# Patient Record
Sex: FEMALE | Race: WHITE | NOT HISPANIC OR LATINO | Employment: UNEMPLOYED | URBAN - NONMETROPOLITAN AREA
[De-identification: names, ages, dates, MRNs, and addresses within clinical notes are randomized per-mention and may not be internally consistent; named-entity substitution may affect disease eponyms.]

---

## 2018-01-04 ENCOUNTER — OFFICE VISIT (OUTPATIENT)
Dept: CARDIOLOGY | Facility: CLINIC | Age: 58
End: 2018-01-04

## 2018-01-04 VITALS
HEART RATE: 67 BPM | SYSTOLIC BLOOD PRESSURE: 118 MMHG | OXYGEN SATURATION: 98 % | BODY MASS INDEX: 30.69 KG/M2 | WEIGHT: 184.2 LBS | DIASTOLIC BLOOD PRESSURE: 73 MMHG | HEIGHT: 65 IN

## 2018-01-04 DIAGNOSIS — R00.2 PALPITATIONS: ICD-10-CM

## 2018-01-04 DIAGNOSIS — E78.2 MIXED HYPERLIPIDEMIA: ICD-10-CM

## 2018-01-04 DIAGNOSIS — I10 ESSENTIAL HYPERTENSION: ICD-10-CM

## 2018-01-04 DIAGNOSIS — Z00.00 HEALTHCARE MAINTENANCE: ICD-10-CM

## 2018-01-04 DIAGNOSIS — Z82.49 FAMILY HISTORY OF CORONARY ARTERY DISEASE: ICD-10-CM

## 2018-01-04 DIAGNOSIS — R06.02 SHORTNESS OF BREATH: Primary | ICD-10-CM

## 2018-01-04 PROBLEM — E11.9 DIABETES: Status: ACTIVE | Noted: 2018-01-04

## 2018-01-04 PROCEDURE — 99204 OFFICE O/P NEW MOD 45 MIN: CPT | Performed by: INTERNAL MEDICINE

## 2018-01-04 PROCEDURE — 93000 ELECTROCARDIOGRAM COMPLETE: CPT | Performed by: INTERNAL MEDICINE

## 2018-01-04 RX ORDER — BENAZEPRIL HYDROCHLORIDE 5 MG/1
5 TABLET, FILM COATED ORAL DAILY
Refills: 0 | COMMUNITY
Start: 2017-12-10 | End: 2020-09-22 | Stop reason: SDUPTHER

## 2018-01-04 RX ORDER — MONTELUKAST SODIUM 10 MG/1
10 TABLET ORAL DAILY
Refills: 0 | COMMUNITY
Start: 2017-12-10

## 2018-01-04 RX ORDER — PRAVASTATIN SODIUM 20 MG
20 TABLET ORAL DAILY
Refills: 0 | COMMUNITY
Start: 2017-12-10 | End: 2018-07-24 | Stop reason: ALTCHOICE

## 2018-01-04 RX ORDER — LORATADINE 10 MG/1
10 TABLET ORAL DAILY
Refills: 0 | COMMUNITY
Start: 2017-12-10 | End: 2022-05-24

## 2018-01-04 RX ORDER — HYDROCHLOROTHIAZIDE 12.5 MG/1
12.5 TABLET ORAL DAILY
Refills: 0 | COMMUNITY
Start: 2017-11-11 | End: 2020-03-19 | Stop reason: SDUPTHER

## 2018-01-04 RX ORDER — PAROXETINE HYDROCHLORIDE 40 MG/1
40 TABLET, FILM COATED ORAL DAILY
Refills: 0 | COMMUNITY
Start: 2018-01-01

## 2018-01-04 RX ORDER — METFORMIN HYDROCHLORIDE 500 MG/1
500 TABLET, EXTENDED RELEASE ORAL 2 TIMES DAILY
Refills: 0 | COMMUNITY
Start: 2017-12-10 | End: 2019-01-24

## 2018-01-04 RX ORDER — ATENOLOL 50 MG/1
50 TABLET ORAL DAILY
Refills: 0 | COMMUNITY
Start: 2017-12-10 | End: 2018-07-24 | Stop reason: ALTCHOICE

## 2018-01-04 RX ORDER — GLIMEPIRIDE 4 MG/1
4 TABLET ORAL 2 TIMES DAILY
Refills: 0 | COMMUNITY
Start: 2017-12-10 | End: 2019-01-24

## 2018-01-04 RX ORDER — NICOTINE POLACRILEX 4 MG/1
40 GUM, CHEWING ORAL DAILY
Refills: 0 | COMMUNITY
Start: 2017-12-10
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
## 2018-01-17 ENCOUNTER — APPOINTMENT (OUTPATIENT)
Dept: CARDIOLOGY | Facility: HOSPITAL | Age: 58
End: 2018-01-17
Attending: INTERNAL MEDICINE

## 2018-01-30 ENCOUNTER — TELEPHONE (OUTPATIENT)
Dept: CARDIOLOGY | Facility: CLINIC | Age: 58
End: 2018-01-30

## 2018-01-30 NOTE — TELEPHONE ENCOUNTER
"PATIENT STATES HAS GOTTEN \"COLD SORES\" IN HER NOSE SINCE STARTING NEW PSORIASIS MED. STATED SHE HAD ALREADY CALLED PRESCRIBING DOCTOR. I TOLD HER THIS HAD NOTHING TO DO WITH HER STRESS TEST PAMELA., AND THAT SHE SHOULD BE FINE. MEDS FOR STRESS TEST PAMELA AND DIET DISCUSSED. PH,LPN  "

## 2018-01-30 NOTE — TELEPHONE ENCOUNTER
----- Message from Jud Beyer LPN sent at 1/30/2018 11:05 AM EST -----   Patient states homer is scheduled for a Stress Test in the AM and she was recently started on a new Psoriasis medication and she has questions about it.

## 2018-01-31 ENCOUNTER — HOSPITAL ENCOUNTER (OUTPATIENT)
Dept: CARDIOLOGY | Facility: HOSPITAL | Age: 58
Discharge: HOME | End: 2018-01-31
Attending: INTERNAL MEDICINE

## 2018-01-31 ENCOUNTER — OUTSIDE FACILITY SERVICE (OUTPATIENT)
Dept: CARDIOLOGY | Facility: CLINIC | Age: 58
End: 2018-01-31

## 2018-01-31 LAB
MAXIMAL PREDICTED HEART RATE: 164 BPM
MAXIMAL PREDICTED HEART RATE: 164 BPM
STRESS TARGET HR: 139 BPM
STRESS TARGET HR: 139 BPM

## 2018-01-31 PROCEDURE — 93018 CV STRESS TEST I&R ONLY: CPT | Performed by: INTERNAL MEDICINE

## 2018-01-31 PROCEDURE — A9500 TC99M SESTAMIBI: HCPCS | Performed by: INTERNAL MEDICINE

## 2018-01-31 PROCEDURE — 78452 HT MUSCLE IMAGE SPECT MULT: CPT | Performed by: INTERNAL MEDICINE

## 2018-01-31 PROCEDURE — 93306 TTE W/DOPPLER COMPLETE: CPT

## 2018-01-31 PROCEDURE — 93017 CV STRESS TEST TRACING ONLY: CPT

## 2018-01-31 PROCEDURE — 78452 HT MUSCLE IMAGE SPECT MULT: CPT

## 2018-01-31 PROCEDURE — 93306 TTE W/DOPPLER COMPLETE: CPT | Performed by: INTERNAL MEDICINE

## 2018-01-31 PROCEDURE — 0 TECHNETIUM SESTAMIBI: Performed by: INTERNAL MEDICINE

## 2018-01-31 RX ADMIN — TECHNETIUM TC 99M SESTAMIBI 1 DOSE: 1 INJECTION INTRAVENOUS at 09:30

## 2018-02-01 ENCOUNTER — TELEPHONE (OUTPATIENT)
Dept: CARDIOLOGY | Facility: CLINIC | Age: 58
End: 2018-02-01

## 2018-02-01 NOTE — TELEPHONE ENCOUNTER
Patient aware stress and echo results. Patient verbalize understanding and was informed to proceed to ER with any worsening symptoms.  Radha scheduled follow up apt. JANELLE/TERRELL

## 2018-02-07 ENCOUNTER — APPOINTMENT (OUTPATIENT)
Dept: LAB | Facility: HOSPITAL | Age: 58
End: 2018-02-07

## 2018-02-07 ENCOUNTER — OFFICE VISIT (OUTPATIENT)
Dept: CARDIOLOGY | Facility: CLINIC | Age: 58
End: 2018-02-07

## 2018-02-07 VITALS
HEART RATE: 98 BPM | SYSTOLIC BLOOD PRESSURE: 144 MMHG | HEIGHT: 65 IN | BODY MASS INDEX: 30.92 KG/M2 | OXYGEN SATURATION: 98 % | DIASTOLIC BLOOD PRESSURE: 88 MMHG | WEIGHT: 185.6 LBS

## 2018-02-07 DIAGNOSIS — R07.2 PRECORDIAL PAIN: Primary | ICD-10-CM

## 2018-02-07 DIAGNOSIS — R94.39 ABNORMAL STRESS TEST: ICD-10-CM

## 2018-02-07 DIAGNOSIS — R06.02 SHORTNESS OF BREATH: ICD-10-CM

## 2018-02-07 PROCEDURE — 80048 BASIC METABOLIC PNL TOTAL CA: CPT | Performed by: PHYSICIAN ASSISTANT

## 2018-02-07 PROCEDURE — 36415 COLL VENOUS BLD VENIPUNCTURE: CPT | Performed by: PHYSICIAN ASSISTANT

## 2018-02-07 PROCEDURE — 99213 OFFICE O/P EST LOW 20 MIN: CPT | Performed by: PHYSICIAN ASSISTANT

## 2018-02-07 RX ORDER — GABAPENTIN 300 MG/1
300 CAPSULE ORAL
Refills: 0 | COMMUNITY
Start: 2018-01-08

## 2018-02-07 RX ORDER — CYCLOBENZAPRINE HCL 10 MG
10 TABLET ORAL 2 TIMES DAILY
Refills: 0 | COMMUNITY
Start: 2018-01-10

## 2018-02-07 RX ORDER — MELOXICAM 15 MG/1
15 TABLET ORAL DAILY
Refills: 0 | COMMUNITY
Start: 2018-01-10 | End: 2018-06-20

## 2018-02-07 RX ORDER — CLOPIDOGREL BISULFATE 75 MG/1
75 TABLET ORAL DAILY
Qty: 30 TABLET | Refills: 5 | Status: SHIPPED | OUTPATIENT
Start: 2018-02-07 | End: 2018-07-10 | Stop reason: SDUPTHER

## 2018-02-07 RX ORDER — HYDROCODONE BITARTRATE AND ACETAMINOPHEN 5; 325 MG/1; MG/1
1 TABLET ORAL 2 TIMES DAILY
Refills: 0 | COMMUNITY
Start: 2018-01-10

## 2018-02-07 NOTE — PATIENT INSTRUCTIONS
Coronary Angiogram With Stent  Coronary angiogram with stent placement is a procedure to widen or open a narrow blood vessel of the heart (coronary artery). Arteries may become blocked by cholesterol buildup (plaques) in the lining or wall. When a coronary artery becomes partially blocked, blood flow to that area decreases. This may lead to chest pain or a heart attack (myocardial infarction).  A stent is a small piece of metal that looks like mesh or a spring. Stent placement may be done as treatment for a heart attack or right after a coronary angiogram in which a blocked artery is found.  Let your health care provider know about:  · Any allergies you have.  · All medicines you are taking, including vitamins, herbs, eye drops, creams, and over-the-counter medicines.  · Any problems you or family members have had with anesthetic medicines.  · Any blood disorders you have.  · Any surgeries you have had.  · Any medical conditions you have.  · Whether you are pregnant or may be pregnant.  What are the risks?  Generally, this is a safe procedure. However, problems may occur, including:  · Damage to the heart or its blood vessels.  · A return of blockage.  · Bleeding, infection, or bruising at the insertion site.  · A collection of blood under the skin (hematoma) at the insertion site.  · A blood clot in another part of the body.  · Kidney injury.  · Allergic reaction to the dye or contrast that is used.  · Bleeding into the abdomen (retroperitoneal bleeding).  What happens before the procedure?  Staying hydrated   Follow instructions from your health care provider about hydration, which may include:  · Up to 2 hours before the procedure - you may continue to drink clear liquids, such as water, clear fruit juice, black coffee, and plain tea.  Eating and drinking restrictions   Follow instructions from your health care provider about eating and drinking, which may include:  · 8 hours before the procedure - stop eating  heavy meals or foods such as meat, fried foods, or fatty foods.  · 6 hours before the procedure - stop eating light meals or foods, such as toast or cereal.  · 2 hours before the procedure - stop drinking clear liquids.  Ask your health care provider about:  · Changing or stopping your regular medicines. This is especially important if you are taking diabetes medicines or blood thinners.  · Taking medicines such as ibuprofen. These medicines can thin your blood. Do not take these medicines before your procedure if your health care provider instructs you not to. Generally, aspirin is recommended before a procedure of passing a small, thin tube (catheter) through a blood vessel and into the heart (cardiac catheterization).  What happens during the procedure?  · An IV tube will be inserted into one of your veins.  · You will be given one or more of the following:  ¨ A medicine to help you relax (sedative).  ¨ A medicine to numb the area where the catheter will be inserted into an artery (local anesthetic).  · To reduce your risk of infection:  ¨ Your health care team will wash or sanitize their hands.  ¨ Your skin will be washed with soap.  ¨ Hair may be removed from the area where the catheter will be inserted.  · Using a guide wire, the catheter will be inserted into an artery. The location may be in your groin, in your wrist, or in the fold of your arm (near your elbow).  · A type of X-ray (fluoroscopy) will be used to help guide the catheter to the opening of the arteries in the heart.  · A dye will be injected into the catheter, and X-rays will be taken. The dye will help to show where any narrowing or blockages are located in the arteries.  · A tiny wire will be guided to the blocked spot, and a balloon will be inflated to make the artery wider.  · The stent will be expanded and will crush the plaques into the wall of the vessel. The stent will hold the area open and improve the blood flow. Most stents have a  drug coating to reduce the risk of the stent narrowing over time.  · The artery may be made wider using a drill, laser, or other tools to remove plaques.  · When the blood flow is better, the catheter will be removed. The lining of the artery will grow over the stent, which stays where it was placed.  This procedure may vary among health care providers and hospitals.  What happens after the procedure?  · If the procedure is done through the leg, you will be kept in bed lying flat for about 6 hours. You will be instructed to not bend and not cross your legs.  · The insertion site will be checked frequently.  · The pulse in your foot or wrist will be checked frequently.  · You may have additional blood tests, X-rays, and a test that records the electrical activity of your heart (electrocardiogram, or ECG).  This information is not intended to replace advice given to you by your health care provider. Make sure you discuss any questions you have with your health care provider.  Document Released: 06/23/2004 Document Revised: 08/17/2017 Document Reviewed: 07/23/2017  Elsevier Interactive Patient Education © 2017 [employer]

## 2018-02-07 NOTE — PROGRESS NOTES
Problem list     Subjective   Bonnie Mohr is a 57 y.o. female     Chief Complaint   Patient presents with   • Follow-up     patient appears in office today for follow up test results (stress/echo)   • Shortness of Breath   • Hypertension       HPI  The patient presents back to review test results.  She was initially seen because of potential cardiomegaly on radiologic studies.  Also with family history, hypertension, dyslipidemia, and symptoms, cardiac evaluation was requested as well.  The patient did have a stress test which suggested possible mild posterior lateral wall ischemia.  Also, the patient's TID was elevated suggesting increased risk.  The patient's echo indicated preserved systolic function, LVH with Mcclelland grade 1 diastolic dysfunction, no significant valvular issues.  The patient continues to have shortness of air with activity.  She has chest tightness at that time as well.  She relates to no PND or orthopnea.  Her chest pain is transient in mild to moderate at best.  This last varied amounts at times.  She has no further complaints otherwise.    Current Outpatient Prescriptions   Medication Sig Dispense Refill   • atenolol (TENORMIN) 50 MG tablet Take 50 mg by mouth Daily.  0   • benazepril (LOTENSIN) 10 MG tablet Take 10 mg by mouth Daily.  0   • cyclobenzaprine (FLEXERIL) 10 MG tablet Take 10 mg by mouth 2 (Two) Times a Day.  0   • gabapentin (NEURONTIN) 300 MG capsule Take 300 mg by mouth every night at bedtime.  0   • glimepiride (AMARYL) 4 MG tablet Take 4 mg by mouth 2 (Two) Times a Day.  0   • hydrochlorothiazide (HYDRODIURIL) 12.5 MG tablet Take 12.5 mg by mouth Daily.  0   • HYDROcodone-acetaminophen (NORCO) 5-325 MG per tablet Take 1 tablet by mouth 2 (Two) Times a Day.  0   • loratadine (CLARITIN) 10 MG tablet Take 10 mg by mouth Daily.  0   • meloxicam (MOBIC) 15 MG tablet Take 15 mg by mouth Daily.  0   • metFORMIN ER (GLUCOPHAGE-XR) 500 MG 24 hr tablet Take 500 mg by mouth 3 (Three)  Times a Day.  0   • montelukast (SINGULAIR) 10 MG tablet Take 10 mg by mouth Daily.  0   • Multiple Vitamin (MULTI VITAMIN PO) Take  by mouth Daily.     • omeprazole (priLOSEC) 20 MG capsule Take 20 mg by mouth Daily.  0   • PARoxetine (PAXIL) 40 MG tablet Take 40 mg by mouth Daily.  0   • pravastatin (PRAVACHOL) 20 MG tablet Take 20 mg by mouth Daily.  0   • clopidogrel (PLAVIX) 75 MG tablet Take 1 tablet by mouth Daily. 30 tablet 5     No current facility-administered medications for this visit.        Erythromycin    Past Medical History:   Diagnosis Date   • Arthritis    • Diabetes mellitus    • Hyperlipidemia    • Hypertension    • Psoriasis    • Psoriatic arthritis        Social History     Social History   • Marital status:      Spouse name: N/A   • Number of children: N/A   • Years of education: N/A     Occupational History   • Not on file.     Social History Main Topics   • Smoking status: Never Smoker   • Smokeless tobacco: Never Used   • Alcohol use No   • Drug use: No   • Sexual activity: Defer     Other Topics Concern   • Not on file     Social History Narrative       Family History   Problem Relation Age of Onset   • Heart disease Mother    • Heart disease Father    • Heart disease Sister    • Diabetes Sister    • Heart disease Brother        Review of Systems   Constitutional: Negative.  Negative for fatigue.   HENT: Positive for congestion (head congestion due to allergies). Negative for rhinorrhea, sneezing and sore throat.    Eyes: Positive for visual disturbance (wears glasses daily).   Respiratory: Positive for cough (dry allergy cough) and shortness of breath (easily SOA ; worse on exertion ). Negative for apnea, chest tightness and wheezing.    Cardiovascular: Positive for palpitations (occasional palpitations/flutters). Negative for chest pain (denies CP) and leg swelling.   Gastrointestinal: Negative.  Negative for abdominal distention, abdominal pain, nausea and vomiting.   Endocrine:  "Negative.  Negative for cold intolerance, heat intolerance, polyphagia and polyuria.   Genitourinary: Negative.  Negative for difficulty urinating, frequency and urgency.   Musculoskeletal: Positive for arthralgias (psoriatic arthritis ). Negative for back pain, myalgias, neck pain and neck stiffness.   Skin: Positive for rash (psoriasis). Negative for wound.   Allergic/Immunologic: Positive for environmental allergies (seasonal allergies). Negative for food allergies.   Neurological: Negative.  Negative for dizziness, weakness, light-headedness and headaches.   Hematological: Bruises/bleeds easily (bruises and bleeds easily).   Psychiatric/Behavioral: Negative.  Negative for agitation, confusion and sleep disturbance (denies waking up smothering/SOA). The patient is not nervous/anxious.        Objective   Vitals:    02/07/18 1036   BP: 144/88   BP Location: Left arm   Patient Position: Sitting   Pulse: 98   SpO2: 98%   Weight: 84.2 kg (185 lb 9.6 oz)   Height: 165.1 cm (65\")      /88 (BP Location: Left arm, Patient Position: Sitting)  Pulse 98  Ht 165.1 cm (65\")  Wt 84.2 kg (185 lb 9.6 oz)  SpO2 98%  BMI 30.89 kg/m2   Lab Results (most recent)     None        Physical Exam   Constitutional: She is oriented to person, place, and time. She appears well-developed and well-nourished. No distress.   HENT:   Head: Normocephalic and atraumatic.   Mouth/Throat: Uvula is midline. No oral lesions.   Eyes: Conjunctivae, EOM and lids are normal. Pupils are equal, round, and reactive to light. Lids are everted and swept, no foreign bodies found.   Neck: Normal range of motion. Neck supple. Normal carotid pulses, no hepatojugular reflux and no JVD present. Carotid bruit is not present. No tracheal deviation present. No thyroid mass and no thyromegaly present.   Cardiovascular: Normal rate, regular rhythm, intact distal pulses and normal pulses.  Exam reveals gallop and S4 (soft). Exam reveals no S3, no friction rub " and no decreased pulses.    No murmur heard.  Pulses:       Carotid pulses are 2+ on the right side, and 2+ on the left side.       Radial pulses are 2+ on the right side, and 2+ on the left side.        Dorsalis pedis pulses are 2+ on the right side, and 2+ on the left side.        Posterior tibial pulses are 2+ on the right side, and 2+ on the left side.   Pulmonary/Chest: Effort normal and breath sounds normal. She has no decreased breath sounds (normal EXP. phase, normal breath sound intensity ). She has no wheezes. She has no rhonchi. She has no rales.   Abdominal: Soft. Bowel sounds are normal. She exhibits no distension, no abdominal bruit and no mass. There is no tenderness. There is no rebound and no guarding.   Negative organomegaly      Musculoskeletal: Normal range of motion. She exhibits no edema, tenderness or deformity.   Lymphadenopathy:     She has no cervical adenopathy.     She has no axillary adenopathy.   Neurological: She is alert and oriented to person, place, and time.   Skin: Skin is warm and dry. No rash noted. No erythema. No pallor.   Psychiatric: She has a normal mood and affect. Her speech is normal and behavior is normal. Judgment and thought content normal. Cognition and memory are normal.   Nursing note and vitals reviewed.        Procedure   Procedures       Assessment/Plan      Diagnosis Plan   1. Precordial pain  Cardiac catheterization    Basic Metabolic Panel   2. Shortness of breath  Cardiac catheterization    Basic Metabolic Panel   3. Abnormal stress test  Cardiac catheterization    Basic Metabolic Panel       With an abnormal stress test and ongoing symptoms, the patient will be scheduled for catheterization.  I have asked that she start aspirin.  I will send him Plavix therapy as well.  I will make no changes and statin therapy for now.  I would like to reevaluate labs before catheterization so that we may review renal function and hematologic parameters.  Nothing further  for now.  We will see her after catheter and recommend further at that time.               Electronically signed by:

## 2018-02-08 LAB
ANION GAP SERPL CALCULATED.3IONS-SCNC: 13.3 MMOL/L (ref 3.6–11.2)
BUN BLD-MCNC: 12 MG/DL (ref 7–21)
BUN/CREAT SERPL: 15.6 (ref 7–25)
CALCIUM SPEC-SCNC: 9.4 MG/DL (ref 7.7–10)
CHLORIDE SERPL-SCNC: 96 MMOL/L (ref 99–112)
CO2 SERPL-SCNC: 26.7 MMOL/L (ref 24.3–31.9)
CREAT BLD-MCNC: 0.77 MG/DL (ref 0.43–1.29)
GFR SERPL CREATININE-BSD FRML MDRD: 78 ML/MIN/1.73
GLUCOSE BLD-MCNC: 330 MG/DL (ref 70–110)
OSMOLALITY SERPL CALC.SUM OF ELEC: 284.6 MOSM/KG (ref 273–305)
POTASSIUM BLD-SCNC: 4.4 MMOL/L (ref 3.5–5.3)
SODIUM BLD-SCNC: 136 MMOL/L (ref 135–153)

## 2018-02-23 ENCOUNTER — OUTSIDE FACILITY SERVICE (OUTPATIENT)
Dept: CARDIOLOGY | Facility: CLINIC | Age: 58
End: 2018-02-23

## 2018-02-23 PROCEDURE — 93458 L HRT ARTERY/VENTRICLE ANGIO: CPT | Performed by: INTERNAL MEDICINE

## 2018-02-23 PROCEDURE — 92928 PRQ TCAT PLMT NTRAC ST 1 LES: CPT | Performed by: INTERNAL MEDICINE

## 2018-02-23 PROCEDURE — 92978 ENDOLUMINL IVUS OCT C 1ST: CPT | Performed by: INTERNAL MEDICINE

## 2018-03-01 ENCOUNTER — OFFICE VISIT (OUTPATIENT)
Dept: CARDIOLOGY | Facility: CLINIC | Age: 58
End: 2018-03-01

## 2018-03-01 VITALS
BODY MASS INDEX: 31.02 KG/M2 | HEIGHT: 65 IN | DIASTOLIC BLOOD PRESSURE: 77 MMHG | OXYGEN SATURATION: 97 % | HEART RATE: 74 BPM | SYSTOLIC BLOOD PRESSURE: 123 MMHG | WEIGHT: 186.2 LBS

## 2018-03-01 DIAGNOSIS — I25.10 CORONARY ARTERY DISEASE INVOLVING NATIVE CORONARY ARTERY OF NATIVE HEART WITHOUT ANGINA PECTORIS: ICD-10-CM

## 2018-03-01 DIAGNOSIS — R06.02 SHORTNESS OF BREATH: ICD-10-CM

## 2018-03-01 DIAGNOSIS — R07.9 CHEST PAIN, UNSPECIFIED TYPE: Primary | ICD-10-CM

## 2018-03-01 PROCEDURE — 99214 OFFICE O/P EST MOD 30 MIN: CPT | Performed by: PHYSICIAN ASSISTANT

## 2018-03-01 RX ORDER — IBUPROFEN 800 MG/1
800 TABLET ORAL AS NEEDED
Refills: 0 | COMMUNITY
Start: 2018-02-08 | End: 2018-07-24 | Stop reason: ALTCHOICE

## 2018-03-01 RX ORDER — MIRTAZAPINE 15 MG/1
15 TABLET, FILM COATED ORAL NIGHTLY
Refills: 0 | COMMUNITY
Start: 2018-02-20 | End: 2018-06-20

## 2018-03-01 RX ORDER — ISOSORBIDE MONONITRATE 30 MG/1
30 TABLET, EXTENDED RELEASE ORAL EVERY MORNING
Qty: 30 TABLET | Refills: 11 | Status: SHIPPED | OUTPATIENT
Start: 2018-03-01 | End: 2019-01-24 | Stop reason: SDUPTHER

## 2018-03-01 NOTE — PROGRESS NOTES
Problem list     Subjective   Bonnie Mohr is a 57 y.o. female     Chief Complaint   Patient presents with   • Coronary Artery Disease     Here for heart cath. f/u   • Hypertension   • Palpitations   • Hyperlipidemia   • Diabetes       HPI  Problem list  1.  Coronary artery disease  1.1 cardiac catheterization February 2018 because of posterolateral ischemia and elevated TID with high-grade stenosis of the RCA status post stenting.  70-80% ostial stenosis of the LAD with medical management recommended and percutaneous intervention for refractory symptoms  2.  Preserved systolic function  3.  Psoriatic arthritis  4.  Diabetes mellitus  5.  Hypertension  6.  Dyslipidemia       Patient is a 57-year-old female that presents back to office to follow-up.  Unfortunately, she continues to have chest discomfort.  She describes having chest discomfort at random times but will experience it frequently at night.  She describes as a burning sensation as well as a pressure that will occur and resolve.  She describes having moderate levels of dyspnea unchanged from before catheterization until now.  No PND orthopnea.  She does palpitate on occasion but no dizziness presyncope or syncope and otherwise is doing well          Outpatient Encounter Prescriptions as of 3/1/2018   Medication Sig Dispense Refill   • aspirin 81 MG tablet Take 81 mg by mouth Daily.     • atenolol (TENORMIN) 50 MG tablet Take 50 mg by mouth Daily.  0   • benazepril (LOTENSIN) 10 MG tablet Take 10 mg by mouth Daily.  0   • clopidogrel (PLAVIX) 75 MG tablet Take 1 tablet by mouth Daily. 30 tablet 5   • cyclobenzaprine (FLEXERIL) 10 MG tablet Take 10 mg by mouth 2 (Two) Times a Day.  0   • gabapentin (NEURONTIN) 300 MG capsule Take 300 mg by mouth every night at bedtime.  0   • glimepiride (AMARYL) 4 MG tablet Take 4 mg by mouth 2 (Two) Times a Day.  0   • hydrochlorothiazide (HYDRODIURIL) 12.5 MG tablet Take 12.5 mg by mouth Daily.  0   •  HYDROcodone-acetaminophen (NORCO) 5-325 MG per tablet Take 1 tablet by mouth 2 (Two) Times a Day.  0   • ibuprofen (ADVIL,MOTRIN) 800 MG tablet prn  0   • loratadine (CLARITIN) 10 MG tablet Take 10 mg by mouth Daily.  0   • meloxicam (MOBIC) 15 MG tablet Take 15 mg by mouth Daily.  0   • metFORMIN ER (GLUCOPHAGE-XR) 500 MG 24 hr tablet Take 500 mg by mouth 3 (Three) Times a Day.  0   • mirtazapine (REMERON) 15 MG tablet Every Night.  0   • montelukast (SINGULAIR) 10 MG tablet Take 10 mg by mouth Daily.  0   • Multiple Vitamin (MULTI VITAMIN PO) Take  by mouth Daily.     • omeprazole (priLOSEC) 20 MG capsule Take 20 mg by mouth Daily.  0   • PARoxetine (PAXIL) 40 MG tablet Take 40 mg by mouth Daily.  0   • pravastatin (PRAVACHOL) 20 MG tablet Take 20 mg by mouth Daily.  0   • isosorbide mononitrate (IMDUR) 30 MG 24 hr tablet Take 1 tablet by mouth Every Morning. 30 tablet 11     No facility-administered encounter medications on file as of 3/1/2018.        Erythromycin    Past Medical History:   Diagnosis Date   • Arthritis    • Coronary artery disease    • Diabetes mellitus    • Hyperlipidemia    • Hypertension    • Psoriasis    • Psoriatic arthritis        Social History     Social History   • Marital status:      Spouse name: N/A   • Number of children: N/A   • Years of education: N/A     Occupational History   • Not on file.     Social History Main Topics   • Smoking status: Never Smoker   • Smokeless tobacco: Never Used   • Alcohol use No   • Drug use: No   • Sexual activity: Defer     Other Topics Concern   • Not on file     Social History Narrative       Family History   Problem Relation Age of Onset   • Heart disease Mother    • Heart disease Father    • Heart disease Sister    • Diabetes Sister    • Heart disease Brother        Review of Systems   Constitutional: Positive for fatigue.   HENT: Negative.    Eyes: Positive for visual disturbance (glasses).   Respiratory: Positive for shortness of breath  "(mildly with exertion).    Cardiovascular: Positive for chest pain, palpitations and leg swelling (occas. mildly).   Gastrointestinal: Negative.    Endocrine: Negative.    Genitourinary: Negative.    Musculoskeletal: Positive for arthralgias (psoriatic) and myalgias.   Skin: Negative.    Allergic/Immunologic: Positive for environmental allergies.   Neurological: Positive for dizziness and light-headedness.   Hematological: Bruises/bleeds easily (bruise).   Psychiatric/Behavioral: Positive for sleep disturbance (off and on).       Objective   Vitals:    03/01/18 1021   BP: 123/77   BP Location: Left arm   Patient Position: Sitting   Pulse: 74   SpO2: 97%   Weight: 84.5 kg (186 lb 3.2 oz)   Height: 165.1 cm (65\")      /77 (BP Location: Left arm, Patient Position: Sitting)  Pulse 74  Ht 165.1 cm (65\")  Wt 84.5 kg (186 lb 3.2 oz)  SpO2 97%  BMI 30.99 kg/m2    Lab Results (most recent)     None          Physical Exam   Constitutional: She is oriented to person, place, and time. She appears well-developed and well-nourished. No distress.   HENT:   Head: Normocephalic and atraumatic.   Eyes: EOM are normal. Pupils are equal, round, and reactive to light.   Neck: No JVD present.   Cardiovascular: Normal rate, regular rhythm and normal heart sounds.  Exam reveals no gallop and no friction rub.    No murmur heard.  Pulmonary/Chest: Effort normal and breath sounds normal. No respiratory distress. She has no wheezes. She has no rales. She exhibits no tenderness.   Musculoskeletal: Normal range of motion. She exhibits no edema.   Neurological: She is alert and oriented to person, place, and time. No cranial nerve deficit.   Skin: Skin is warm and dry. No rash noted. No erythema. No pallor.   Psychiatric: She has a normal mood and affect. Her behavior is normal.   Nursing note and vitals reviewed.      Procedure   Procedures       Assessment/Plan     Problems Addressed this Visit        Cardiovascular and Mediastinum "    Coronary artery disease involving native coronary artery of native heart without angina pectoris    Relevant Medications    isosorbide mononitrate (IMDUR) 30 MG 24 hr tablet       Respiratory    Shortness of breath       Nervous and Auditory    Chest pain - Primary    Relevant Orders    Ambulatory Referral to Gastroenterology            Recommendation    1.  Dr. Toribio came in the room to further discuss with the patient.  It was discussed about intervening in the LAD as well as looking for alternative causes of chest pain.  Patient would like to try that first.  She would like to have medical management.  If she continues to have symptoms on nitrates, she will consider cardiac catheterization.  We will prescribe isosorbide.  I will refer to GI because the patient's chest pain expressed at night frequently.    2.  We will see patient back for follow-up in a few weeks.  Any chest pain not resolved by nitroglycerin, she is to go to the ER.  Follow-up primary as scheduled         Electronically signed by:

## 2018-03-17 ENCOUNTER — APPOINTMENT (OUTPATIENT)
Dept: WOMENS IMAGING | Facility: HOSPITAL | Age: 58
End: 2018-03-17

## 2018-03-17 PROCEDURE — 77063 BREAST TOMOSYNTHESIS BI: CPT | Performed by: RADIOLOGY

## 2018-03-17 PROCEDURE — 77067 SCR MAMMO BI INCL CAD: CPT | Performed by: RADIOLOGY

## 2018-05-10 ENCOUNTER — OFFICE VISIT (OUTPATIENT)
Dept: CARDIOLOGY | Facility: CLINIC | Age: 58
End: 2018-05-10

## 2018-05-10 VITALS
WEIGHT: 185.2 LBS | DIASTOLIC BLOOD PRESSURE: 74 MMHG | HEIGHT: 65 IN | BODY MASS INDEX: 30.86 KG/M2 | HEART RATE: 84 BPM | SYSTOLIC BLOOD PRESSURE: 117 MMHG | OXYGEN SATURATION: 99 %

## 2018-05-10 DIAGNOSIS — R00.2 PALPITATIONS: ICD-10-CM

## 2018-05-10 DIAGNOSIS — I25.10 CORONARY ARTERY DISEASE INVOLVING NATIVE CORONARY ARTERY OF NATIVE HEART WITHOUT ANGINA PECTORIS: ICD-10-CM

## 2018-05-10 DIAGNOSIS — R06.02 SHORTNESS OF BREATH: ICD-10-CM

## 2018-05-10 DIAGNOSIS — R07.2 PRECORDIAL PAIN: Primary | ICD-10-CM

## 2018-05-10 DIAGNOSIS — E78.2 MIXED HYPERLIPIDEMIA: ICD-10-CM

## 2018-05-10 DIAGNOSIS — R55 SYNCOPE, UNSPECIFIED SYNCOPE TYPE: ICD-10-CM

## 2018-05-10 DIAGNOSIS — I10 ESSENTIAL HYPERTENSION: ICD-10-CM

## 2018-05-10 DIAGNOSIS — Z82.49 FAMILY HISTORY OF CORONARY ARTERY DISEASE: ICD-10-CM

## 2018-05-10 PROCEDURE — 99214 OFFICE O/P EST MOD 30 MIN: CPT | Performed by: INTERNAL MEDICINE

## 2018-05-10 RX ORDER — NITROGLYCERIN 0.4 MG/1
TABLET SUBLINGUAL
Qty: 25 TABLET | Refills: 11 | Status: SHIPPED | OUTPATIENT
Start: 2018-05-10 | End: 2020-09-22 | Stop reason: SDUPTHER

## 2018-05-10 RX ORDER — ADALIMUMAB 40MG/0.8ML
KIT SUBCUTANEOUS
COMMUNITY
Start: 2018-05-02 | End: 2019-07-26

## 2018-05-10 NOTE — PROGRESS NOTES
"Subjective   Bonnie Mohr is a 57 y.o. female     Chief Complaint   Patient presents with   • Follow-up     2 month f/u    • Loss of Consciousness     had a pain through forhead radited down right eye while singing in [place] and sudden syncope, did not lose control of bowel or bladder   • Shortness of Breath     with exertion    • Chest Pain     rare, pressure like        PROBLEM LIST:   1.  Coronary artery disease  1.1 cardiac catheterization February 2018 because of posterolateral ischemia and elevated TID with high-grade stenosis of the RCA status post stenting.  70-80% ostial stenosis of the LAD with medical management recommended and percutaneous intervention for refractory symptoms  2.  Preserved systolic function  3.  Psoriatic arthritis  4.  Diabetes mellitus  5.  Hypertension  6.  Dyslipidemia      Specialty Problems        Cardiology Problems    Essential hypertension        Mixed hyperlipidemia        Palpitations        Coronary artery disease involving native coronary artery of native heart without angina pectoris                HPI:  Ms. Mohr returns for follow-up on coronary artery disease and cardiac risk factor modification.    She had minimal improvement with the addition of long-acting nitroglycerin.  She continues to have mild chest discomfort when she climbs stairs or walks and inclined, but has only shortness of breath walking on a level surface.  However, she feels that her shortness of breath is still disproportionate to her degree of physical activity.    Ms. Mohr denies orthopnea, PND, or lower extremity edema.  She doesn't palpitate.  The patient describes one episode of syncope.  She was singing in [place], had been standing for proximal he 10 minutes, felt warm.  She then began to feel \"funny\" but was not palpitating and did not have chest pain or shortness of breath.  She had a brief loss of consciousness without loss of bowel or bladder control and awakened without Armond's " paralysis, post ictal state, chest pain, or dyspnea.  CURRENT MEDICATION:    Current Outpatient Prescriptions   Medication Sig Dispense Refill   • aspirin 81 MG tablet Take 81 mg by mouth Daily.     • atenolol (TENORMIN) 50 MG tablet Take 50 mg by mouth Daily.  0   • benazepril (LOTENSIN) 10 MG tablet Take 10 mg by mouth Daily.  0   • clopidogrel (PLAVIX) 75 MG tablet Take 1 tablet by mouth Daily. 30 tablet 5   • cyclobenzaprine (FLEXERIL) 10 MG tablet Take 10 mg by mouth 2 (Two) Times a Day.  0   • gabapentin (NEURONTIN) 300 MG capsule Take 300 mg by mouth every night at bedtime.  0   • glimepiride (AMARYL) 4 MG tablet Take 4 mg by mouth 2 (Two) Times a Day.  0   • HUMIRA PEN 40 MG/0.8ML Pen-injector Kit      • hydrochlorothiazide (HYDRODIURIL) 12.5 MG tablet Take 12.5 mg by mouth Daily.  0   • HYDROcodone-acetaminophen (NORCO) 5-325 MG per tablet Take 1 tablet by mouth 2 (Two) Times a Day.  0   • ibuprofen (ADVIL,MOTRIN) 800 MG tablet prn  0   • isosorbide mononitrate (IMDUR) 30 MG 24 hr tablet Take 1 tablet by mouth Every Morning. 30 tablet 11   • loratadine (CLARITIN) 10 MG tablet Take 10 mg by mouth Daily.  0   • meloxicam (MOBIC) 15 MG tablet Take 15 mg by mouth Daily.  0   • metFORMIN ER (GLUCOPHAGE-XR) 500 MG 24 hr tablet Take 500 mg by mouth 3 (Three) Times a Day.  0   • mirtazapine (REMERON) 15 MG tablet Every Night.  0   • montelukast (SINGULAIR) 10 MG tablet Take 10 mg by mouth Daily.  0   • Multiple Vitamin (MULTI VITAMIN PO) Take  by mouth Daily.     • omeprazole (priLOSEC) 20 MG capsule Take 20 mg by mouth Daily.  0   • PARoxetine (PAXIL) 40 MG tablet Take 40 mg by mouth Daily.  0   • pravastatin (PRAVACHOL) 20 MG tablet Take 20 mg by mouth Daily.  0     No current facility-administered medications for this visit.        ALLERGIES:    Erythromycin    PAST MEDICAL HISTORY:    Past Medical History:   Diagnosis Date   • Arthritis    • Coronary artery disease    • Diabetes mellitus    • Hyperlipidemia     • Hypertension    • Psoriasis    • Psoriatic arthritis        SURGICAL HISTORY:    Past Surgical History:   Procedure Laterality Date   • CARDIAC CATHETERIZATION     • CHOLECYSTECTOMY     • CORONARY STENT PLACEMENT         SOCIAL HISTORY:    Social History     Social History   • Marital status:      Spouse name: N/A   • Number of children: N/A   • Years of education: N/A     Occupational History   • Not on file.     Social History Main Topics   • Smoking status: Never Smoker   • Smokeless tobacco: Never Used   • Alcohol use No   • Drug use: No   • Sexual activity: Defer     Other Topics Concern   • Not on file     Social History Narrative   • No narrative on file       FAMILY HISTORY:    Family History   Problem Relation Age of Onset   • Heart disease Mother    • Heart disease Father    • Heart disease Sister    • Diabetes Sister    • Heart disease Brother        Review of Systems   Constitutional: Positive for diaphoresis and fatigue. Negative for chills, fever and unexpected weight change.   HENT: Positive for congestion, postnasal drip, rhinorrhea, sinus pain and sinus pressure. Negative for dental problem, drooling, ear discharge, ear pain, facial swelling, hearing loss, mouth sores, nosebleeds, sneezing, sore throat, tinnitus, trouble swallowing and voice change.    Eyes: Positive for visual disturbance.   Respiratory: Positive for chest tightness and shortness of breath. Negative for cough, choking, wheezing and stridor.    Cardiovascular: Positive for chest pain (rare ,pressure like ) and palpitations (intermittent, frequency maybe 1-2 x a week). Negative for leg swelling.   Gastrointestinal: Negative for abdominal pain, blood in stool (no melena, no hematuria, no hematemesis, no hematochezia ), constipation, diarrhea, nausea and vomiting.   Endocrine: Negative for cold intolerance and heat intolerance.   Musculoskeletal: Positive for arthralgias.   Skin: Negative for color change, pallor, rash and  "wound.   Allergic/Immunologic: Positive for environmental allergies. Negative for food allergies and immunocompromised state.   Neurological: Positive for syncope (1 episode sunday while at [employer] singing in [facility], felt sharyn pain in head that radiated down right eye followed by syncope, did not lose control of bowel or bladder) and light-headedness. Negative for dizziness, tremors, seizures, facial asymmetry (no stroke like symptoms), speech difficulty, weakness, numbness and headaches.   Hematological: Negative for adenopathy. Does not bruise/bleed easily.   Psychiatric/Behavioral: The patient is nervous/anxious.        VISIT VITALS:  Vitals:    05/10/18 0952   BP: 117/74   BP Location: Left arm   Patient Position: Sitting   Pulse: 84   SpO2: 99%   Weight: 84 kg (185 lb 3.2 oz)   Height: 165.1 cm (65\")      /74 (BP Location: Left arm, Patient Position: Sitting)   Pulse 84   Ht 165.1 cm (65\")   Wt 84 kg (185 lb 3.2 oz)   SpO2 99%   BMI 30.82 kg/m²     RECENT LABS:    Objective       Physical Exam    Procedures      Assessment/Plan   #1.  Coronary artery disease.  Ms. Mohr had improvement but not resolution of his her exertional chest pain and dyspnea after stenting of the right coronary artery.  She has angiographically significant LAD disease but had no evidence of ischemia in that distribution on stress testing.  However, the patient is having persistent symptoms despite near maximal guideline directed therapy.    #2.  Therefore, I would like Ms. Mohr to complete her GI evaluation on an expedited basis.  If she has pathology potentially culpable in chest pain, and hasn't improvement with treatment, I think that further medical management and close clinical observation with regard to chest pain would be appropriate.  However, if she has no significant GI disease, or has persistent symptoms despite treatment, we would need to pursue cardiac catheterization with the intent of mechanical " revascularization in the LAD distribution because of medically refractory angina.    #3.  Syncope.  Ms. Mohr describes symptoms compatible with neurocardiogenic syncope.  She did not palpitate, have chest pain, or dyspnea.  However, in the setting of known coronary artery disease, I would like to perform an event monitoring to exclude a malignant dysrhythmia.    #4.  The patient was given a prescription for sublingual nitroglycerin today with instructions in its use.    #5.  His Fani will follow-up with Dr. Lee per his instructions, and in our office as soon and has GI testing and/or treatment are complete or and no more than 2 months.  No diagnosis found.    No Follow-up on file.              Patient's Body mass index is 30.82 kg/m². BMI is above normal parameters. Recommendations include: educational material.       Adolfo Morgan MA   Scribed for Dr. Mark Toribio by TERRELL Chau May 10, 2018 9:58 AM               Electronically signed by:            This note is dictated utilizing voice recognition software.  Although this record has been proof read, transcriptional errors may still be present. If questions occur regarding the content of this record please do not hesitate to call our office.

## 2018-05-21 ENCOUNTER — TELEPHONE (OUTPATIENT)
Dept: CARDIOLOGY | Facility: CLINIC | Age: 58
End: 2018-05-21

## 2018-05-21 NOTE — TELEPHONE ENCOUNTER
----- Message from Dena Vidal sent at 5/21/2018  1:27 PM EDT -----  Regarding: EVENT MONITORS  Contact: [phone]  PT IS HAVING A TERRIBLE TIME WITH HER MONITOR AND WOULD LIKE FOR YOU TO CALL HER BACK. SHE IS AFRAID THAT SHE MAY NOT BE ABLE TO WEAR.

## 2018-05-21 NOTE — TELEPHONE ENCOUNTER
Meredith baires CMA spoke with patient as documented in her telephone call.   ----- Message from Adolfo Morgan MA sent at 5/21/2018  9:44 AM EDT -----  Contact: patient   Patient called stating she is having trouble with her patches staying on with her event monitor.

## 2018-05-21 NOTE — TELEPHONE ENCOUNTER
Patient called stating that she is unable to wear the cardiac event monitor. Patient stated that she has silicone strips but she said they will not stay on and are breaking her out . I offered the patient acrylic strips and patient wanted me to talk to Dr. grimaldo's nurse to see if she really needed to wear it. Per Adolfo TEJADA,  if patient is not willing to try acrylic strips,  the patient will need to send the monitor back.

## 2018-05-23 ENCOUNTER — TELEPHONE (OUTPATIENT)
Dept: CARDIOLOGY | Facility: CLINIC | Age: 58
End: 2018-05-23

## 2018-05-23 NOTE — TELEPHONE ENCOUNTER
Patient called stating that the monitor she is wears is breaking her out and she is unable to wear it. I advised the patient to send the monitor back.

## 2018-06-12 ENCOUNTER — TELEPHONE (OUTPATIENT)
Dept: CARDIOLOGY | Facility: CLINIC | Age: 58
End: 2018-06-12

## 2018-06-13 ENCOUNTER — TELEPHONE (OUTPATIENT)
Dept: CARDIOLOGY | Facility: CLINIC | Age: 58
End: 2018-06-13

## 2018-06-13 NOTE — TELEPHONE ENCOUNTER
----- Message from Jaelyn Lopez sent at 6/13/2018  8:39 AM EDT -----  Regarding: Cardiac Event Monitor  Contact: [phone]   Pt returning missed call for monitor results.

## 2018-06-19 ENCOUNTER — DOCUMENTATION (OUTPATIENT)
Dept: CARDIOLOGY | Facility: CLINIC | Age: 58
End: 2018-06-19

## 2018-06-19 NOTE — PROGRESS NOTES
Cardiac clearance req was received in office from  . This was reviewed by Skinny Giron PA-C and faxed back. -;[facility]

## 2018-06-20 ENCOUNTER — OFFICE VISIT (OUTPATIENT)
Dept: CARDIOLOGY | Facility: CLINIC | Age: 58
End: 2018-06-20

## 2018-06-20 VITALS
HEART RATE: 79 BPM | WEIGHT: 180 LBS | HEIGHT: 65 IN | DIASTOLIC BLOOD PRESSURE: 82 MMHG | BODY MASS INDEX: 29.99 KG/M2 | OXYGEN SATURATION: 98 % | SYSTOLIC BLOOD PRESSURE: 140 MMHG

## 2018-06-20 DIAGNOSIS — R06.02 SHORTNESS OF BREATH: ICD-10-CM

## 2018-06-20 DIAGNOSIS — I10 ESSENTIAL HYPERTENSION: ICD-10-CM

## 2018-06-20 DIAGNOSIS — R07.2 PRECORDIAL PAIN: ICD-10-CM

## 2018-06-20 DIAGNOSIS — I25.119 CORONARY ARTERY DISEASE INVOLVING NATIVE CORONARY ARTERY OF NATIVE HEART WITH ANGINA PECTORIS (HCC): Primary | ICD-10-CM

## 2018-06-20 PROCEDURE — 99213 OFFICE O/P EST LOW 20 MIN: CPT | Performed by: PHYSICIAN ASSISTANT
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
## 2018-06-20 NOTE — PATIENT INSTRUCTIONS

## 2018-06-25 ENCOUNTER — TELEPHONE (OUTPATIENT)
Dept: CARDIOLOGY | Facility: CLINIC | Age: 58
End: 2018-06-25

## 2018-06-25 DIAGNOSIS — R06.02 SHORTNESS OF BREATH: ICD-10-CM

## 2018-06-25 DIAGNOSIS — I25.119 CORONARY ARTERY DISEASE INVOLVING NATIVE CORONARY ARTERY OF NATIVE HEART WITH ANGINA PECTORIS (HCC): Primary | ICD-10-CM

## 2018-06-25 DIAGNOSIS — R07.2 PRECORDIAL PAIN: ICD-10-CM

## 2018-06-25 NOTE — TELEPHONE ENCOUNTER
----- Message from Usman Rooney Rep sent at 6/25/2018  9:12 AM EDT -----  Regarding: heart cath films  Contact: [phone]  Pt seen last week jacqueline told her he would talk to dr grimaldo & have him look at her cath films and call her back. If she had not heard from anybody by last Friday she was to call us.

## 2018-06-25 NOTE — TELEPHONE ENCOUNTER
Cardiac cath films room feb, 2018 were reviewed by Dr. Toribio per Skinny Giron, Pac request. Dr. Toribio feels he can re-cath patient and fix area. Per Skinny Giron, PAC scheduled patient for cath. I have scheduled patient for July 13 th 2018 at 11:00 am patient to arrive at  [facility] at 9:00 am. Patient was made aware to hold metformin 24 hrs prior and may continue all other med's. Patient does not have a contrast dye allergy or shell allergy. Cr. 0.77 and does not need premed with mucomyst. Patient verbalize understanding and is to call our office with questions or concens.

## 2018-06-25 NOTE — TELEPHONE ENCOUNTER
Dianelys has discussed this patient with  and she has scheduled her for repeat heart cath. -[facility]

## 2018-06-27 ENCOUNTER — TELEPHONE (OUTPATIENT)
Dept: CARDIOLOGY | Facility: CLINIC | Age: 58
End: 2018-06-27

## 2018-06-27 NOTE — TELEPHONE ENCOUNTER
Patient just questioned to see if Dr. Toribio feels that he can go back in her heart and stent the areas he was talking to her about without causing a heart attack because patient states at the time of last cath Dr. Toribio states he did not want to fix the area because he was afraid it would cause a MI. Patient was concerned and wants to know if he thinks he can go in the area and fix. Dr. Toribio feels he can back back in and fix area. Patient feels comfortable with Dr. Toribio doing her LHC and would prefer not to have anyone else.  Patient is scheduled for 07/13 and was made aware to proceed to     ----- Message from Alma Delia Vega sent at 6/27/2018 11:24 AM EDT -----  Contact: PATIENT  THE PATIENT CALLED AND STATES SHE NEEDS TO SPEAK TO A NURSE.  SHE STATES WILI AND DR TORIBIO WERE SUPPOSED TO DECIDE WHAT THEY WERE GOING TO DO.  SHE HAS QUESTIONS ABOUT THE POSSIBILITY OF A CATH.  SHE IS HAVING SOME SOA.  SHE CAN BE REACHED [phone].  THANKS

## 2018-07-10 DIAGNOSIS — I25.10 CORONARY ARTERY DISEASE DUE TO CALCIFIED CORONARY LESION: Primary | ICD-10-CM

## 2018-07-10 DIAGNOSIS — I25.84 CORONARY ARTERY DISEASE DUE TO CALCIFIED CORONARY LESION: Primary | ICD-10-CM

## 2018-07-11 RX ORDER — CLOPIDOGREL BISULFATE 75 MG/1
TABLET ORAL
Qty: 30 TABLET | Refills: 5 | Status: SHIPPED | OUTPATIENT
Start: 2018-07-11 | End: 2019-01-24 | Stop reason: SDUPTHER

## 2018-07-13 ENCOUNTER — OUTSIDE FACILITY SERVICE (OUTPATIENT)
Dept: CARDIOLOGY | Facility: CLINIC | Age: 58
End: 2018-07-13

## 2018-07-13 PROCEDURE — 92978 ENDOLUMINL IVUS OCT C 1ST: CPT | Performed by: INTERNAL MEDICINE

## 2018-07-13 PROCEDURE — 92928 PRQ TCAT PLMT NTRAC ST 1 LES: CPT | Performed by: INTERNAL MEDICINE

## 2018-07-13 PROCEDURE — 93454 CORONARY ARTERY ANGIO S&I: CPT | Performed by: INTERNAL MEDICINE

## 2018-07-24 ENCOUNTER — OFFICE VISIT (OUTPATIENT)
Dept: CARDIOLOGY | Facility: CLINIC | Age: 58
End: 2018-07-24

## 2018-07-24 VITALS
WEIGHT: 183.6 LBS | DIASTOLIC BLOOD PRESSURE: 83 MMHG | SYSTOLIC BLOOD PRESSURE: 128 MMHG | HEART RATE: 78 BPM | OXYGEN SATURATION: 94 % | HEIGHT: 65 IN | BODY MASS INDEX: 30.59 KG/M2

## 2018-07-24 DIAGNOSIS — E78.2 MIXED HYPERLIPIDEMIA: Primary | ICD-10-CM

## 2018-07-24 DIAGNOSIS — I10 ESSENTIAL HYPERTENSION: ICD-10-CM

## 2018-07-24 DIAGNOSIS — R06.02 SHORTNESS OF BREATH: ICD-10-CM

## 2018-07-24 DIAGNOSIS — I25.10 CORONARY ARTERY DISEASE INVOLVING NATIVE CORONARY ARTERY OF NATIVE HEART WITHOUT ANGINA PECTORIS: ICD-10-CM

## 2018-07-24 PROCEDURE — 99214 OFFICE O/P EST MOD 30 MIN: CPT | Performed by: INTERNAL MEDICINE

## 2018-07-24 RX ORDER — ATORVASTATIN CALCIUM 40 MG/1
40 TABLET, FILM COATED ORAL DAILY
Qty: 30 TABLET | Refills: 11 | Status: SHIPPED | OUTPATIENT
Start: 2018-07-24 | End: 2019-01-24 | Stop reason: SDUPTHER

## 2018-07-24 RX ORDER — CARVEDILOL 12.5 MG/1
12.5 TABLET ORAL 2 TIMES DAILY
Qty: 60 TABLET | Refills: 11 | Status: SHIPPED | OUTPATIENT
Start: 2018-07-24 | End: 2019-01-24 | Stop reason: SDUPTHER

## 2018-07-24 NOTE — PROGRESS NOTES
Subjective   Bonnie Mohr is a 57 y.o. female     Chief Complaint   Patient presents with   • Follow-up     presents for cath follow up   • Coronary Artery Disease   • Shortness of Breath       PROBLEM LIST:       1.  Coronary artery disease  1.1 Cardiac catheterization February 2018 because of posterolateral ischemia and elevated TID with high-grade stenosis of the RCA status post stenting.  70-80% ostial stenosis of the LAD with medical management recommended and percutaneous intervention for refractory symptoms  1.2 [facility] 07/13/18 demonstrated stenting X2 to LAD and stenting X1 to RCA  2.  Preserved systolic function  3.  Psoriatic arthritis  4.  Diabetes mellitus  5.  Hypertension  6.  Dyslipidemia          Specialty Problems        Cardiology Problems    Essential hypertension        Mixed hyperlipidemia        Palpitations        Coronary artery disease involving native coronary artery of native heart without angina pectoris          Ms. Mohr returns for follow-up after multivessel stenting.  She underwent stenting of complex disease in the LAD and a vein new angiographically significant stenosis in the right coronary artery.    She has had complete resolution of her chest pain since that procedure.  Ms. Mohr also denies orthopnea, PND, or lower extremity edema.  She has no palpitations, dizziness, presyncope, or syncope.  She states that she may have proven in her exercise capacity and decrease in exertional dyspnea.  She is tolerating medications without difficulty.              CURRENT MEDICATION:    Current Outpatient Prescriptions   Medication Sig Dispense Refill   • aspirin 81 MG tablet Take 81 mg by mouth Daily.     • benazepril (LOTENSIN) 10 MG tablet Take 10 mg by mouth Daily.  0   • clopidogrel (PLAVIX) 75 MG tablet take 1 tablet by mouth once daily 30 tablet 5   • cyclobenzaprine (FLEXERIL) 10 MG tablet Take 10 mg by mouth 2 (Two) Times a Day.  0   • gabapentin (NEURONTIN) 300 MG capsule Take  300 mg by mouth every night at bedtime.  0   • glimepiride (AMARYL) 4 MG tablet Take 4 mg by mouth 2 (Two) Times a Day.  0   • HUMIRA PEN 40 MG/0.8ML Pen-injector Kit      • hydrochlorothiazide (HYDRODIURIL) 12.5 MG tablet Take 12.5 mg by mouth Daily.  0   • HYDROcodone-acetaminophen (NORCO) 5-325 MG per tablet Take 1 tablet by mouth 2 (Two) Times a Day.  0   • isosorbide mononitrate (IMDUR) 30 MG 24 hr tablet Take 1 tablet by mouth Every Morning. 30 tablet 11   • loratadine (CLARITIN) 10 MG tablet Take 10 mg by mouth Daily.  0   • metFORMIN ER (GLUCOPHAGE-XR) 500 MG 24 hr tablet Take 500 mg by mouth 2 (Two) Times a Day.  0   • montelukast (SINGULAIR) 10 MG tablet Take 10 mg by mouth Daily.  0   • Multiple Vitamin (MULTI VITAMIN PO) Take  by mouth Daily.     • nitroglycerin (NITROSTAT) 0.4 MG SL tablet 1 under the tongue as needed for angina, may repeat q5mins for up three doses 25 tablet 11   • omeprazole (priLOSEC) 20 MG capsule Take 20 mg by mouth Daily.  0   • PARoxetine (PAXIL) 40 MG tablet Take 40 mg by mouth Daily.  0     No current facility-administered medications for this visit.        ALLERGIES:    Erythromycin    PAST MEDICAL HISTORY:    Past Medical History:   Diagnosis Date   • Arthritis    • Coronary artery disease    • Diabetes mellitus (CMS/HCC)    • Hyperlipidemia    • Hypertension    • Psoriasis    • Psoriatic arthritis (CMS/HCC)        SURGICAL HISTORY:    Past Surgical History:   Procedure Laterality Date   • CARDIAC CATHETERIZATION     • CHOLECYSTECTOMY     • CORONARY STENT PLACEMENT         SOCIAL HISTORY:    Social History     Social History   • Marital status:      Spouse name: N/A   • Number of children: N/A   • Years of education: N/A     Occupational History   • Not on file.     Social History Main Topics   • Smoking status: Never Smoker   • Smokeless tobacco: Never Used   • Alcohol use No   • Drug use: No   • Sexual activity: Defer     Other Topics Concern   • Not on file  "    Social History Narrative   • No narrative on file       FAMILY HISTORY:    Family History   Problem Relation Age of Onset   • Heart disease Mother    • Heart disease Father    • Heart disease Sister    • Diabetes Sister    • Heart disease Brother        Review of Systems   Constitutional: Positive for chills and fatigue (better since the cath but easily fatigued due to Humira). Negative for diaphoresis and fever.   HENT: Positive for congestion, sinus pain, sinus pressure and tinnitus. Negative for nosebleeds and rhinorrhea.    Eyes: Positive for visual disturbance (wears glasses).   Respiratory: Positive for shortness of breath (on exertion). Negative for apnea, chest tightness and wheezing.    Cardiovascular: Positive for leg swelling (occas BLE ). Negative for chest pain (denies CP) and palpitations (denies palps).   Gastrointestinal: Negative.  Negative for abdominal pain, blood in stool, diarrhea, nausea and vomiting.   Endocrine: Negative.    Genitourinary: Positive for difficulty urinating. Negative for hematuria.   Musculoskeletal: Positive for arthralgias, back pain, myalgias, neck pain and neck stiffness.   Skin: Negative.  Negative for rash and wound.   Allergic/Immunologic: Positive for environmental allergies (seasonal). Negative for food allergies.   Neurological: Positive for weakness (generalized), light-headedness (on occasion when bensing over or getting up really quick) and numbness (feet at times). Negative for dizziness, syncope and headaches.   Hematological: Bruises/bleeds easily (both).   Psychiatric/Behavioral: Positive for sleep disturbance (denies waking up smothering/soa). Negative for agitation. The patient is nervous/anxious.        VISIT VITALS:  Vitals:    07/24/18 1153   BP: 128/83   BP Location: Left arm   Patient Position: Sitting   Pulse: 78   SpO2: 94%   Weight: 83.3 kg (183 lb 9.6 oz)   Height: 165.1 cm (65\")      /83 (BP Location: Left arm, Patient Position: Sitting)  " " Pulse 78   Ht 165.1 cm (65\")   Wt 83.3 kg (183 lb 9.6 oz)   SpO2 94%   BMI 30.55 kg/m²     RECENT LABS:    Objective       Physical Exam   Constitutional: She is oriented to person, place, and time. She appears well-developed and well-nourished. No distress.   HENT:   Head: Normocephalic and atraumatic.   Eyes: Pupils are equal, round, and reactive to light. Conjunctivae and EOM are normal.   Neck: Normal range of motion. Neck supple. Normal carotid pulses, no hepatojugular reflux and no JVD present. Carotid bruit is not present. No tracheal deviation present. No thyroid mass and no thyromegaly present.   Cardiovascular: Normal rate, regular rhythm and intact distal pulses.  Exam reveals gallop and S4 (soft). Exam reveals no S3, no distant heart sounds and no friction rub.    No murmur heard.   No systolic murmur is present    No diastolic murmur is present   Pulses:       Carotid pulses are 2+ on the right side, and 2+ on the left side.       Radial pulses are 2+ on the right side, and 2+ on the left side.        Dorsalis pedis pulses are 2+ on the right side, and 2+ on the left side.        Posterior tibial pulses are 2+ on the right side, and 2+ on the left side.   Pulmonary/Chest: Effort normal and breath sounds normal.   Abdominal: Soft. Bowel sounds are normal. She exhibits no distension, no abdominal bruit and no mass. There is no tenderness. There is no rebound and no guarding.   Negative organomegaly      Musculoskeletal: Normal range of motion. She exhibits no edema, tenderness or deformity.   Lymphadenopathy:     She has no cervical adenopathy.     She has no axillary adenopathy.   Neurological: She is alert and oriented to person, place, and time.   Skin: Skin is warm and dry. No rash noted. She is not diaphoretic. No erythema. No pallor.   Psychiatric: She has a normal mood and affect. Her speech is normal and behavior is normal. Judgment and thought content normal. Cognition and memory are " normal.   Nursing note and vitals reviewed.      Procedures      Assessment/Plan    #1.  Coronary artery disease status post recent multivessel stenting as outlined above, with resolution of chest discomfort.    #2.  I think the patient would benefit or medication changes.  We will change atenolol to carvedilol 12.5 mg twice a day because of the patient's diabetes, and pravastatin and start atorvastatin 40 mg daily.    #3.  Serial blood pressure checks #4.  Fasting lipid profile liver enzymes in 6-8 weeks.    #5.  The patient was asked to start a regular aerobic exercise program in an effort to increase her exercise capacity.    #6.  Ms. Mohr will follow-up with Dr. Lee as instructed, and in our office in 4-6 months or on a when necessary basis for symptoms or medication intolerance as discussed in detail today             Diagnosis Plan   1. Mixed hyperlipidemia     2. Essential hypertension     3. Coronary artery disease involving native coronary artery of native heart without angina pectoris     4. Shortness of breath         No Follow-up on file.         Patient's Body mass index is 30.55 kg/m². BMI is above normal parameters. Recommendations include: educational material.       Adolfo Morgan MA  Scribed for Dr. Mark Toribio by Adolfo Morgan, TERRELL July 24, 2018 12:58 PM           Electronically signed by:            This note is dictated utilizing voice recognition software.  Although this record has been proof read, transcriptional errors may still be present. If questions occur regarding the content of this record please do not hesitate to call our office.

## 2018-07-24 NOTE — PATIENT INSTRUCTIONS
Heart-Healthy Eating Plan  Many factors influence your heart health, including eating and exercise habits. Heart (coronary) risk increases with abnormal blood fat (lipid) levels. Heart-healthy meal planning includes limiting unhealthy fats, increasing healthy fats, and making other small dietary changes. This includes maintaining a healthy body weight to help keep lipid levels within a normal range.  What is my plan?  Your health care provider recommends that you:  · Get no more than _________% of the total calories in your daily diet from fat.  · Limit your intake of saturated fat to less than _________% of your total calories each day.  · Limit the amount of cholesterol in your diet to less than _________ mg per day.    What types of fat should I choose?  · Choose healthy fats more often. Choose monounsaturated and polyunsaturated fats, such as olive oil and canola oil, flaxseeds, walnuts, almonds, and seeds.  · Eat more omega-3 fats. Good choices include salmon, mackerel, sardines, tuna, flaxseed oil, and ground flaxseeds. Aim to eat fish at least two times each week.  · Limit saturated fats. Saturated fats are primarily found in animal products, such as meats, butter, and cream. Plant sources of saturated fats include palm oil, palm kernel oil, and coconut oil.  · Avoid foods with partially hydrogenated oils in them. These contain trans fats. Examples of foods that contain trans fats are stick margarine, some tub margarines, cookies, crackers, and other baked goods.  What general guidelines do I need to follow?  · Check food labels carefully to identify foods with trans fats or high amounts of saturated fat.  · Fill one half of your plate with vegetables and green salads. Eat 4-5 servings of vegetables per day. A serving of vegetables equals 1 cup of raw leafy vegetables, ½ cup of raw or cooked cut-up vegetables, or ½ cup of vegetable juice.  · Fill one fourth of your plate with whole grains. Look for the word  "\"whole\" as the first word in the ingredient list.  · Fill one fourth of your plate with lean protein foods.  · Eat 4-5 servings of fruit per day. A serving of fruit equals one medium whole fruit, ¼ cup of dried fruit, ½ cup of fresh, frozen, or canned fruit, or ½ cup of 100% fruit juice.  · Eat more foods that contain soluble fiber. Examples of foods that contain this type of fiber are apples, broccoli, carrots, beans, peas, and barley. Aim to get 20-30 g of fiber per day.  · Eat more home-cooked food and less restaurant, buffet, and fast food.  · Limit or avoid alcohol.  · Limit foods that are high in starch and sugar.  · Avoid fried foods.  · Cook foods by using methods other than frying. Baking, boiling, grilling, and broiling are all great options. Other fat-reducing suggestions include:  ? Removing the skin from poultry.  ? Removing all visible fats from meats.  ? Skimming the fat off of stews, soups, and gravies before serving them.  ? Steaming vegetables in water or broth.  · Lose weight if you are overweight. Losing just 5-10% of your initial body weight can help your overall health and prevent diseases such as diabetes and heart disease.  · Increase your consumption of nuts, legumes, and seeds to 4-5 servings per week. One serving of dried beans or legumes equals ½ cup after being cooked, one serving of nuts equals 1½ ounces, and one serving of seeds equals ½ ounce or 1 tablespoon.  · You may need to monitor your salt (sodium) intake, especially if you have high blood pressure. Talk with your health care provider or dietitian to get more information about reducing sodium.  What foods can I eat?  Grains    Breads, including Colombian, white, duane, wheat, raisin, rye, oatmeal, and Italian. Tortillas that are neither fried nor made with lard or trans fat. Low-fat rolls, including hotdog and hamburger buns and English muffins. Biscuits. Muffins. Waffles. Pancakes. Light popcorn. Whole-grain cereals. Flatbread. " Princess toast. Pretzels. Breadsticks. Rusks. Low-fat snacks and crackers, including oyster, saltine, matzo, john, animal, and rye. Rice and pasta, including brown rice and those that are made with whole wheat.  Vegetables  All vegetables.  Fruits  All fruits, but limit coconut.  Meats and Other Protein Sources  Lean, well-trimmed beef, veal, pork, and lamb. Chicken and turkey without skin. All fish and shellfish. Wild duck, rabbit, pheasant, and venison. Egg whites or low-cholesterol egg substitutes. Dried beans, peas, lentils, and tofu. Seeds and most nuts.  Dairy  Low-fat or nonfat cheeses, including ricotta, string, and mozzarella. Skim or 1% milk that is liquid, powdered, or evaporated. Buttermilk that is made with low-fat milk. Nonfat or low-fat yogurt.  Beverages  Mineral water. Diet carbonated beverages.  Sweets and Desserts  Sherbets and fruit ices. Honey, jam, marmalade, jelly, and syrups. Meringues and gelatins. Pure sugar candy, such as hard candy, jelly beans, gumdrops, mints, marshmallows, and small amounts of dark chocolate. Omar food cake.  Eat all sweets and desserts in moderation.  Fats and Oils  Nonhydrogenated (trans-free) margarines. Vegetable oils, including soybean, sesame, sunflower, olive, peanut, safflower, corn, canola, and cottonseed. Salad dressings or mayonnaise that are made with a vegetable oil. Limit added fats and oils that you use for cooking, baking, salads, and as spreads.  Other  Cocoa powder. Coffee and tea. All seasonings and condiments.  The items listed above may not be a complete list of recommended foods or beverages. Contact your dietitian for more options.  What foods are not recommended?  Grains  Breads that are made with saturated or trans fats, oils, or whole milk. Croissants. Butter rolls. Cheese breads. Sweet rolls. Donuts. Buttered popcorn. Chow mein noodles. High-fat crackers, such as cheese or butter crackers.  Meats and Other Protein Sources  Fatty meats, such  as hotdogs, short ribs, sausage, spareribs, rico, ribeye roast or steak, and mutton. High-fat deli meats, such as salami and bologna. Caviar. Domestic duck and goose. Organ meats, such as kidney, liver, sweetbreads, brains, gizzard, chitterlings, and heart.  Dairy  Cream, sour cream, cream cheese, and creamed cottage cheese. Whole milk cheeses, including blue (s[place]), [place] Steve, Brie, Mikel, American, Havarti, Swiss, cheddar, Camembert, and [place]. Whole or 2% milk that is liquid, evaporated, or condensed. Whole buttermilk. Cream sauce or high-fat cheese sauce. Yogurt that is made from whole milk.  Beverages  Regular sodas and drinks with added sugar.  Sweets and Desserts  Frosting. Pudding. Cookies. Cakes other than micki food cake. Candy that has milk chocolate or white chocolate, hydrogenated fat, butter, coconut, or unknown ingredients. Buttered syrups. Full-fat ice cream or ice cream drinks.  Fats and Oils  Gravy that has suet, meat fat, or shortening. Cocoa butter, hydrogenated oils, palm oil, coconut oil, palm kernel oil. These can often be found in baked products, candy, fried foods, nondairy creamers, and whipped toppings. Solid fats and shortenings, including rico fat, salt pork, lard, and butter. Nondairy cream substitutes, such as coffee creamers and sour cream substitutes. Salad dressings that are made of unknown oils, cheese, or sour cream.  The items listed above may not be a complete list of foods and beverages to avoid. Contact your dietitian for more information.  This information is not intended to replace advice given to you by your health care provider. Make sure you discuss any questions you have with your health care provider.  Document Released: 09/26/2009 Document Revised: 07/07/2017 Document Reviewed: 06/11/2015  Recombine Interactive Patient Education © 2018 [employer]

## 2018-09-04 ENCOUNTER — RESULTS ENCOUNTER (OUTPATIENT)
Dept: CARDIOLOGY | Facility: CLINIC | Age: 58
End: 2018-09-04

## 2018-09-04 DIAGNOSIS — R06.02 SHORTNESS OF BREATH: ICD-10-CM

## 2018-09-04 DIAGNOSIS — I25.10 CORONARY ARTERY DISEASE INVOLVING NATIVE CORONARY ARTERY OF NATIVE HEART WITHOUT ANGINA PECTORIS: ICD-10-CM

## 2018-09-04 DIAGNOSIS — E78.2 MIXED HYPERLIPIDEMIA: ICD-10-CM

## 2018-09-04 DIAGNOSIS — I10 ESSENTIAL HYPERTENSION: ICD-10-CM

## 2018-10-24 ENCOUNTER — TELEPHONE (OUTPATIENT)
Dept: CARDIOLOGY | Facility: CLINIC | Age: 58
End: 2018-10-24

## 2018-10-24 NOTE — TELEPHONE ENCOUNTER
10/18/2018: this patients last OV was placed on Dr. Toribio's desk to review last office notes. -BH;DONAVAN

## 2018-10-24 NOTE — TELEPHONE ENCOUNTER
Cardiac clearance has been reviewed by . At this point due to recent cardiac stenting in 07/2018 patient CANNOT hold Plavix. If they can wait until Jan 2019, patient will be at 6 month faizan and clearance can be re-reviewed at that point.   Patient was notified and was not very happy about decision, I explained to patient in depth our reasoning behind this and the stewart if she stopped medication. Patient verbalized understanding and had no further questions at this time. -BRIDGETT;DONAVAN

## 2018-10-24 NOTE — TELEPHONE ENCOUNTER
----- Message from Meredith Ruiz MA sent at 10/17/2018 12:39 PM EDT -----      ----- Message -----  From: Jaelyn Lopez  Sent: 10/15/2018  10:10 AM  To: Juan Molina Griffith    Pt is requesting a call from a nurse regarding her stents and needing teeth pulled.

## 2019-01-24 ENCOUNTER — OFFICE VISIT (OUTPATIENT)
Dept: CARDIOLOGY | Facility: CLINIC | Age: 59
End: 2019-01-24

## 2019-01-24 VITALS
BODY MASS INDEX: 31.82 KG/M2 | HEART RATE: 68 BPM | HEIGHT: 65 IN | OXYGEN SATURATION: 91 % | WEIGHT: 191 LBS | SYSTOLIC BLOOD PRESSURE: 102 MMHG | DIASTOLIC BLOOD PRESSURE: 64 MMHG

## 2019-01-24 DIAGNOSIS — R06.09 DYSPNEA ON EXERTION: ICD-10-CM

## 2019-01-24 DIAGNOSIS — E78.2 MIXED HYPERLIPIDEMIA: ICD-10-CM

## 2019-01-24 DIAGNOSIS — I10 ESSENTIAL HYPERTENSION: ICD-10-CM

## 2019-01-24 DIAGNOSIS — I25.10 CORONARY ARTERY DISEASE INVOLVING NATIVE CORONARY ARTERY OF NATIVE HEART WITHOUT ANGINA PECTORIS: Primary | ICD-10-CM

## 2019-01-24 PROCEDURE — 93000 ELECTROCARDIOGRAM COMPLETE: CPT | Performed by: PHYSICIAN ASSISTANT

## 2019-01-24 PROCEDURE — 99213 OFFICE O/P EST LOW 20 MIN: CPT | Performed by: PHYSICIAN ASSISTANT

## 2019-01-24 RX ORDER — HYDROXYZINE HYDROCHLORIDE 25 MG/1
25 TABLET, FILM COATED ORAL AS NEEDED
Refills: 2 | COMMUNITY
Start: 2019-01-04

## 2019-01-24 RX ORDER — ERTUGLIFLOZIN 15 MG/1
15 TABLET, FILM COATED ORAL DAILY
COMMUNITY
Start: 2019-01-23

## 2019-01-24 RX ORDER — ATORVASTATIN CALCIUM 40 MG/1
40 TABLET, FILM COATED ORAL DAILY
Qty: 30 TABLET | Refills: 3 | Status: SHIPPED | OUTPATIENT
Start: 2019-01-24 | End: 2019-05-29 | Stop reason: SDUPTHER

## 2019-01-24 RX ORDER — CLOPIDOGREL BISULFATE 75 MG/1
75 TABLET ORAL DAILY
Qty: 30 TABLET | Refills: 3 | Status: SHIPPED | OUTPATIENT
Start: 2019-01-24 | End: 2019-05-19 | Stop reason: SDUPTHER

## 2019-01-24 RX ORDER — ISOSORBIDE MONONITRATE 30 MG/1
30 TABLET, EXTENDED RELEASE ORAL EVERY MORNING
Qty: 30 TABLET | Refills: 3 | Status: SHIPPED | OUTPATIENT
Start: 2019-01-24 | End: 2019-06-17 | Stop reason: SDUPTHER

## 2019-01-24 RX ORDER — CARVEDILOL 12.5 MG/1
12.5 TABLET ORAL 2 TIMES DAILY
Qty: 60 TABLET | Refills: 3 | Status: SHIPPED | OUTPATIENT
Start: 2019-01-24 | End: 2019-08-05 | Stop reason: SDUPTHER

## 2019-01-24 RX ORDER — INSULIN GLARGINE 100 [IU]/ML
50 INJECTION, SOLUTION SUBCUTANEOUS DAILY
Refills: 1 | COMMUNITY
Start: 2018-10-19

## 2019-01-24 RX ORDER — AMOXICILLIN 875 MG/1
875 TABLET, COATED ORAL 2 TIMES DAILY
Refills: 0 | COMMUNITY
Start: 2019-01-04 | End: 2019-07-26

## 2019-01-24 NOTE — PROGRESS NOTES
Problem list     Subjective   Bonnie Mohr is a 58 y.o. female     Chief Complaint   Patient presents with   • Hyperlipidemia   • Hypertension   • Coronary Artery Disease   • Shortness of Breath   PROBLEM LIST:         1.  Coronary artery disease  1.1 Cardiac catheterization February 2018 because of posterolateral ischemia and elevated TID with high-grade stenosis of the RCA status post stenting.  70-80% ostial stenosis of the LAD with medical management recommended and percutaneous intervention for refractory symptoms  1.2 [facility] 07/13/18 demonstrated stenting X2 to LAD and stenting X1 to RCA  2.  Preserved systolic function  3.  Psoriatic arthritis  4.  Diabetes mellitus  5.  Hypertension  6.  Dyslipidemia     HPI    The patient presents in today for symptom review and to evaluate clinical course.  Since stenting in July as above, she has felt very much stable from cardiovascular standpoint.  Her previously described episodes of chest pain have now resolved.  Her dyspnea even seems somewhat improved.  She has no PND or orthopnea.  She relates to no palpitations, dizziness, or syncope.  Blood pressures remained very well-controlled when checked.  Labs are followed with her primary care provider and reported as normal by the patient.  She is tolerating current medication without complication.  She has no further complaints otherwise and feels she is doing well.  Current Outpatient Medications   Medication Sig Dispense Refill   • amoxicillin (AMOXIL) 875 MG tablet Take 875 mg by mouth 2 (Two) Times a Day.  0   • aspirin 81 MG tablet Take 81 mg by mouth Daily.     • atorvastatin (LIPITOR) 40 MG tablet Take 1 tablet by mouth Daily. 30 tablet 3   • benazepril (LOTENSIN) 10 MG tablet Take 10 mg by mouth Daily.  0   • carvedilol (COREG) 12.5 MG tablet Take 1 tablet by mouth 2 (Two) Times a Day. 60 tablet 3   • clopidogrel (PLAVIX) 75 MG tablet Take 1 tablet by mouth Daily. 30 tablet 3   • cyclobenzaprine (FLEXERIL) 10 MG  tablet Take 10 mg by mouth 2 (Two) Times a Day.  0   • gabapentin (NEURONTIN) 300 MG capsule Take 300 mg by mouth every night at bedtime.  0   • HUMIRA PEN 40 MG/0.8ML Pen-injector Kit      • hydrochlorothiazide (HYDRODIURIL) 12.5 MG tablet Take 12.5 mg by mouth Daily.  0   • HYDROcodone-acetaminophen (NORCO) 5-325 MG per tablet Take 1 tablet by mouth 2 (Two) Times a Day.  0   • hydrOXYzine (ATARAX) 10 MG tablet Take 10 mg by mouth As Needed.  2   • Insulin Glargine (BASAGLAR KWIKPEN) 100 UNIT/ML injection pen Inject 50 Units under the skin into the appropriate area as directed Daily.  1   • isosorbide mononitrate (IMDUR) 30 MG 24 hr tablet Take 1 tablet by mouth Every Morning. 30 tablet 3   • loratadine (CLARITIN) 10 MG tablet Take 10 mg by mouth Daily.  0   • montelukast (SINGULAIR) 10 MG tablet Take 10 mg by mouth Daily.  0   • Multiple Vitamin (MULTI VITAMIN PO) Take  by mouth Daily.     • nitroglycerin (NITROSTAT) 0.4 MG SL tablet 1 under the tongue as needed for angina, may repeat q5mins for up three doses 25 tablet 11   • omeprazole (priLOSEC) 20 MG capsule Take 20 mg by mouth Daily.  0   • PARoxetine (PAXIL) 40 MG tablet Take 40 mg by mouth Daily.  0   • Semaglutide (OZEMPIC) 0.25 or 0.5 MG/DOSE solution pen-injector Inject  under the skin into the appropriate area as directed 1 (One) Time Per Week.     • STEGLATRO 15 MG tablet Take 15 mg by mouth Daily.       No current facility-administered medications for this visit.        Erythromycin    Past Medical History:   Diagnosis Date   • Arthritis    • Coronary artery disease    • Diabetes mellitus (CMS/HCC)    • Hyperlipidemia    • Hypertension    • Psoriasis    • Psoriatic arthritis (CMS/HCC)        Social History     Socioeconomic History   • Marital status:      Spouse name: Not on file   • Number of children: Not on file   • Years of education: Not on file   • Highest education level: Not on file   Social Needs   • Financial resource strain: Not on  file   • Food insecurity - worry: Not on file   • Food insecurity - inability: Not on file   • Transportation needs - medical: Not on file   • Transportation needs - non-medical: Not on file   Occupational History   • Not on file   Tobacco Use   • Smoking status: Never Smoker   • Smokeless tobacco: Never Used   Substance and Sexual Activity   • Alcohol use: No   • Drug use: No   • Sexual activity: Defer   Other Topics Concern   • Not on file   Social History Narrative   • Not on file       Family History   Problem Relation Age of Onset   • Heart disease Mother    • Heart disease Father    • Heart disease Sister    • Diabetes Sister    • Heart disease Brother        Review of Systems   Constitutional: Positive for fatigue (easily fatigued).   HENT: Negative.  Negative for congestion, rhinorrhea, sneezing and sore throat.    Eyes: Positive for visual disturbance (wears glasses daily).   Respiratory: Positive for shortness of breath (easily SOA; worse on exertion ). Negative for apnea, cough, chest tightness and wheezing.    Cardiovascular: Positive for palpitations (occasional palpitations). Negative for chest pain (denies CP) and leg swelling.   Gastrointestinal: Negative.  Negative for abdominal distention, abdominal pain, nausea and vomiting.   Endocrine: Negative.  Negative for cold intolerance and heat intolerance.   Genitourinary: Negative.  Negative for difficulty urinating, frequency and urgency.   Musculoskeletal: Positive for arthralgias (joints). Negative for back pain, myalgias, neck pain and neck stiffness.   Skin: Negative.  Negative for rash and wound.   Allergic/Immunologic: Negative.  Negative for environmental allergies and food allergies.   Neurological: Negative.  Negative for dizziness, syncope, weakness, light-headedness and headaches.   Hematological: Bruises/bleeds easily (bruises and bleeds easily).   Psychiatric/Behavioral: Negative.  Negative for agitation, confusion and sleep disturbance  "(denies waking up smothering/SOA). The patient is not nervous/anxious.        Objective   Vitals:    01/24/19 1127   BP: 102/64   BP Location: Left arm   Patient Position: Sitting   Cuff Size: Adult   Pulse: 68   SpO2: 91%   Weight: 86.6 kg (191 lb)   Height: 165.1 cm (65\")      /64 (BP Location: Left arm, Patient Position: Sitting, Cuff Size: Adult)   Pulse 68   Ht 165.1 cm (65\")   Wt 86.6 kg (191 lb)   SpO2 91%   BMI 31.78 kg/m²    Lab Results (most recent)     None        Physical Exam   Constitutional: She is oriented to person, place, and time. She appears well-developed and well-nourished. No distress.   HENT:   Head: Normocephalic and atraumatic.   Mouth/Throat: Uvula is midline. No oral lesions.   Eyes: Conjunctivae, EOM and lids are normal. Pupils are equal, round, and reactive to light. Lids are everted and swept, no foreign bodies found.   Neck: Normal range of motion. Neck supple. Normal carotid pulses, no hepatojugular reflux and no JVD present. Carotid bruit is not present. No tracheal deviation present. No thyroid mass and no thyromegaly present.   Cardiovascular: Normal rate, regular rhythm, intact distal pulses and normal pulses. Exam reveals gallop and S4 (soft). Exam reveals no S3, no friction rub and no decreased pulses.   No murmur heard.  Pulses:       Carotid pulses are 2+ on the right side, and 2+ on the left side.       Radial pulses are 2+ on the right side, and 2+ on the left side.        Dorsalis pedis pulses are 2+ on the right side, and 2+ on the left side.        Posterior tibial pulses are 2+ on the right side, and 2+ on the left side.   Pulmonary/Chest: Effort normal and breath sounds normal. She has no decreased breath sounds (normal EXP. phase, normal breath sound intensity ). She has no wheezes. She has no rhonchi. She has no rales.   Abdominal: Soft. Bowel sounds are normal. She exhibits no distension, no abdominal bruit and no mass. There is no tenderness. There is no " rebound and no guarding.   Negative organomegaly      Musculoskeletal: Normal range of motion. She exhibits no edema, tenderness or deformity.   Lymphadenopathy:     She has no cervical adenopathy.     She has no axillary adenopathy.   Neurological: She is alert and oriented to person, place, and time.   Skin: Skin is warm and dry. No rash noted. No erythema. No pallor.   Psychiatric: She has a normal mood and affect. Her speech is normal and behavior is normal. Judgment and thought content normal. Cognition and memory are normal.   Nursing note and vitals reviewed.        Procedure     ECG 12 Lead  Date/Time: 1/24/2019 11:36 AM  Performed by: Skinny Giron PA  Authorized by: Skinny Giron PA   Comments: HTN    Sinus rhythm at 80, left axis deviation, no acute changes noted.               Assessment/Plan      Diagnosis Plan   1. Coronary artery disease involving native coronary artery of native heart without angina pectoris     2. Mixed hyperlipidemia     3. Essential hypertension  ECG 12 Lead   4. Dyspnea on exertion  clopidogrel (PLAVIX) 75 MG tablet       The patient continues to do very well after stenting in July of last year's above.  Her episodes of angina have now resolved.  She is tolerating current medication for which I will make no changes.  Otherwise, she reports no symptoms of angina, failure, or dysrhythmias.  I will make no changes and see her back on 6 month basis.  She will call for any complications prior to follow-up.          Patient's Body mass index is 31.78 kg/m². BMI is above normal parameters. Recommendations include: educational material and referral to primary care.             Electronically signed by:

## 2019-01-24 NOTE — PATIENT INSTRUCTIONS

## 2019-02-01 ENCOUNTER — DOCUMENTATION (OUTPATIENT)
Dept: CARDIOLOGY | Facility: CLINIC | Age: 59
End: 2019-02-01

## 2019-02-01 NOTE — PROGRESS NOTES
Cardiac clearance req was received in office from . This was reviewed by  and faxed back. -;[facility]

## 2019-03-20 ENCOUNTER — APPOINTMENT (OUTPATIENT)
Dept: WOMENS IMAGING | Facility: HOSPITAL | Age: 59
End: 2019-03-20

## 2019-03-20 PROCEDURE — 77063 BREAST TOMOSYNTHESIS BI: CPT | Performed by: RADIOLOGY

## 2019-03-20 PROCEDURE — 77067 SCR MAMMO BI INCL CAD: CPT | Performed by: RADIOLOGY

## 2019-05-19 DIAGNOSIS — R06.09 DYSPNEA ON EXERTION: ICD-10-CM

## 2019-05-20 RX ORDER — CLOPIDOGREL BISULFATE 75 MG/1
TABLET ORAL
Qty: 30 TABLET | Refills: 0 | Status: SHIPPED | OUTPATIENT
Start: 2019-05-20 | End: 2020-03-19 | Stop reason: SDUPTHER

## 2019-05-29 RX ORDER — ATORVASTATIN CALCIUM 40 MG/1
TABLET, FILM COATED ORAL
Qty: 90 TABLET | Refills: 3 | Status: SHIPPED | OUTPATIENT
Start: 2019-05-29 | End: 2020-03-19 | Stop reason: SDUPTHER

## 2019-06-16 DIAGNOSIS — R06.09 DYSPNEA ON EXERTION: ICD-10-CM

## 2019-06-17 RX ORDER — CLOPIDOGREL BISULFATE 75 MG/1
TABLET ORAL
Qty: 30 TABLET | Refills: 2 | Status: SHIPPED | OUTPATIENT
Start: 2019-06-17 | End: 2019-07-26 | Stop reason: SDUPTHER

## 2019-06-17 RX ORDER — ISOSORBIDE MONONITRATE 30 MG/1
30 TABLET, EXTENDED RELEASE ORAL EVERY MORNING
Qty: 30 TABLET | Refills: 0 | Status: SHIPPED | OUTPATIENT
Start: 2019-06-17 | End: 2020-09-22 | Stop reason: SDUPTHER

## 2019-07-26 ENCOUNTER — OFFICE VISIT (OUTPATIENT)
Dept: CARDIOLOGY | Facility: CLINIC | Age: 59
End: 2019-07-26

## 2019-07-26 VITALS
HEIGHT: 65 IN | SYSTOLIC BLOOD PRESSURE: 99 MMHG | HEART RATE: 78 BPM | BODY MASS INDEX: 32.02 KG/M2 | WEIGHT: 192.2 LBS | DIASTOLIC BLOOD PRESSURE: 63 MMHG | OXYGEN SATURATION: 95 %

## 2019-07-26 DIAGNOSIS — R06.02 SHORTNESS OF BREATH: ICD-10-CM

## 2019-07-26 DIAGNOSIS — I25.119 CORONARY ARTERY DISEASE INVOLVING NATIVE CORONARY ARTERY OF NATIVE HEART WITH ANGINA PECTORIS (HCC): Primary | ICD-10-CM

## 2019-07-26 DIAGNOSIS — R07.2 PRECORDIAL PAIN: ICD-10-CM

## 2019-07-26 PROCEDURE — 99213 OFFICE O/P EST LOW 20 MIN: CPT | Performed by: PHYSICIAN ASSISTANT

## 2019-07-26 RX ORDER — MEDROXYPROGESTERONE ACETATE 150 MG/ML
INJECTION, SUSPENSION INTRAMUSCULAR TAKE AS DIRECTED
COMMUNITY
Start: 2019-07-22 | End: 2020-09-22

## 2019-07-26 RX ORDER — BUSPIRONE HYDROCHLORIDE 30 MG/1
30 TABLET ORAL 2 TIMES DAILY
Refills: 5 | COMMUNITY
Start: 2019-06-24 | End: 2022-05-24

## 2019-07-26 NOTE — PATIENT INSTRUCTIONS

## 2019-07-26 NOTE — PROGRESS NOTES
Problem list     Subjective   Bonnie Mohr is a 58 y.o. female     Chief Complaint   Patient presents with   • Coronary Artery Disease   PROBLEM LIST:         1.  Coronary artery disease  1.1 Cardiac catheterization February 2018 because of posterolateral ischemia and elevated TID with high-grade stenosis of the RCA status post stenting.  70-80% ostial stenosis of the LAD with medical management recommended and percutaneous intervention for refractory symptoms  1.2 [facility] 07/13/18 demonstrated stenting X2 to LAD and stenting X1 to RCA  2.  Preserved systolic function  3.  Psoriatic arthritis  4.  Diabetes mellitus  5.  Hypertension  6.  Dyslipidemia     HPI  The patient presents in today for evaluation.  She is concerned about recent symptoms.  She will no precordial chest pressure.  She also notes associated mid back pain.  She is worried that this possibly could represent an anginal equivalent symptom.  She has no referral to the neck, arm, or jaw.  She has associated dyspnea but no diaphoresis or nausea.  This does not however represent what she experienced previously completely.  She has no PND orthopnea.  Blood pressures have been fairly well controlled.  She has no complications or concerns otherwise.    Current Outpatient Medications   Medication Sig Dispense Refill   • aspirin 81 MG tablet Take 81 mg by mouth Daily.     • atorvastatin (LIPITOR) 40 MG tablet TAKE 1 TABLET BY MOUTH EVERY DAY 90 tablet 3   • benazepril (LOTENSIN) 10 MG tablet Take 10 mg by mouth Daily.  0   • busPIRone (BUSPAR) 15 MG tablet Take 15 mg by mouth 2 (Two) Times a Day.  5   • carvedilol (COREG) 12.5 MG tablet Take 1 tablet by mouth 2 (Two) Times a Day. 60 tablet 3   • clopidogrel (PLAVIX) 75 MG tablet TAKE 1 TABLET BY MOUTH EVERY DAY 30 tablet 0   • cyclobenzaprine (FLEXERIL) 10 MG tablet Take 10 mg by mouth 2 (Two) Times a Day.  0   • ENBREL SURECLICK 50 MG/ML solution auto-injector Take As Directed.     • gabapentin (NEURONTIN) 300  MG capsule Take 300 mg by mouth every night at bedtime.  0   • hydrochlorothiazide (HYDRODIURIL) 12.5 MG tablet Take 12.5 mg by mouth Daily.  0   • HYDROcodone-acetaminophen (NORCO) 5-325 MG per tablet Take 1 tablet by mouth 2 (Two) Times a Day.  0   • hydrOXYzine (ATARAX) 10 MG tablet Take 10 mg by mouth As Needed.  2   • Insulin Glargine (BASAGLAR KWIKPEN) 100 UNIT/ML injection pen Inject 50 Units under the skin into the appropriate area as directed Daily.  1   • isosorbide mononitrate (IMDUR) 30 MG 24 hr tablet TAKE 1 TABLET BY MOUTH EVERY MORNING 30 tablet 0   • loratadine (CLARITIN) 10 MG tablet Take 10 mg by mouth Daily.  0   • montelukast (SINGULAIR) 10 MG tablet Take 10 mg by mouth Daily.  0   • Multiple Vitamin (MULTI VITAMIN PO) Take  by mouth Daily.     • nitroglycerin (NITROSTAT) 0.4 MG SL tablet 1 under the tongue as needed for angina, may repeat q5mins for up three doses 25 tablet 11   • omeprazole (priLOSEC) 20 MG capsule Take 20 mg by mouth Daily.  0   • PARoxetine (PAXIL) 40 MG tablet Take 40 mg by mouth Daily.  0   • Semaglutide (OZEMPIC) 0.25 or 0.5 MG/DOSE solution pen-injector Inject  under the skin into the appropriate area as directed 1 (One) Time Per Week.     • STEGLATRO 15 MG tablet Take 15 mg by mouth Daily.       No current facility-administered medications for this visit.        Erythromycin    Past Medical History:   Diagnosis Date   • Arthritis    • Coronary artery disease    • Diabetes mellitus (CMS/HCC)    • Hyperlipidemia    • Hypertension    • Psoriasis    • Psoriatic arthritis (CMS/HCC)        Social History     Socioeconomic History   • Marital status:      Spouse name: Not on file   • Number of children: Not on file   • Years of education: Not on file   • Highest education level: Not on file   Tobacco Use   • Smoking status: Never Smoker   • Smokeless tobacco: Never Used   Substance and Sexual Activity   • Alcohol use: No   • Drug use: No   • Sexual activity: Defer  "      Family History   Problem Relation Age of Onset   • Heart disease Mother    • Heart disease Father    • Heart disease Sister    • Diabetes Sister    • Heart disease Brother        Review of Systems   Constitutional: Positive for fatigue (easily fatiqued).   HENT: Positive for congestion (nasal congestion from allergies). Negative for rhinorrhea and sneezing.    Eyes: Positive for visual disturbance (wears glasses daily).   Respiratory: Positive for shortness of breath (easily SOA ; worse on exertion ). Negative for cough, chest tightness and wheezing.    Cardiovascular: Positive for chest pain (precordial pain) and palpitations (occasional flutters). Negative for leg swelling.   Gastrointestinal: Negative for abdominal pain, nausea and vomiting.   Endocrine: Negative.  Negative for cold intolerance and heat intolerance.   Genitourinary: Negative.  Negative for difficulty urinating, frequency and urgency.   Musculoskeletal: Negative.  Negative for arthralgias, back pain, neck pain and neck stiffness.   Skin: Negative.  Negative for rash and wound.   Allergic/Immunologic: Negative.  Negative for environmental allergies and food allergies.   Neurological: Negative.  Negative for dizziness, syncope, weakness, light-headedness and headaches.   Hematological: Bruises/bleeds easily (bruises and bleeds easily).   Psychiatric/Behavioral: Negative for agitation, confusion and sleep disturbance (denies waking up smothering/SOA). The patient is nervous/anxious (easily nervous/anxious).        Objective   Vitals:    07/26/19 1105   BP: 99/63   BP Location: Left arm   Patient Position: Sitting   Pulse: 78   SpO2: 95%   Weight: 87.2 kg (192 lb 3.2 oz)   Height: 165.1 cm (65\")      BP 99/63 (BP Location: Left arm, Patient Position: Sitting)   Pulse 78   Ht 165.1 cm (65\")   Wt 87.2 kg (192 lb 3.2 oz)   SpO2 95%   BMI 31.98 kg/m²    Lab Results (most recent)     None        Physical Exam   Constitutional: She is oriented to " person, place, and time. She appears well-developed and well-nourished. No distress.   HENT:   Head: Normocephalic and atraumatic.   Mouth/Throat: Uvula is midline. No oral lesions.   Eyes: Conjunctivae, EOM and lids are normal. Pupils are equal, round, and reactive to light. Lids are everted and swept, no foreign bodies found.   Neck: Normal range of motion. Neck supple. Normal carotid pulses, no hepatojugular reflux and no JVD present. Carotid bruit is not present. No tracheal deviation present. No thyroid mass and no thyromegaly present.   Cardiovascular: Normal rate, regular rhythm, intact distal pulses and normal pulses. Exam reveals gallop and S4 (soft). Exam reveals no S3, no friction rub and no decreased pulses.   No murmur heard.  Pulses:       Carotid pulses are 2+ on the right side, and 2+ on the left side.       Radial pulses are 2+ on the right side, and 2+ on the left side.        Dorsalis pedis pulses are 2+ on the right side, and 2+ on the left side.        Posterior tibial pulses are 2+ on the right side, and 2+ on the left side.   Pulmonary/Chest: Effort normal and breath sounds normal. She has no decreased breath sounds (normal EXP. phase, normal breath sound intensity ). She has no wheezes. She has no rhonchi. She has no rales.   Abdominal: Soft. Bowel sounds are normal. She exhibits no distension, no abdominal bruit and no mass. There is no tenderness. There is no rebound and no guarding.   Negative organomegaly      Musculoskeletal: Normal range of motion. She exhibits no edema, tenderness or deformity.   Lymphadenopathy:     She has no cervical adenopathy.     She has no axillary adenopathy.   Neurological: She is alert and oriented to person, place, and time.   Skin: Skin is warm and dry. No rash noted. No erythema. No pallor.   Psychiatric: She has a normal mood and affect. Her speech is normal and behavior is normal. Judgment and thought content normal. Cognition and memory are normal.    Nursing note and vitals reviewed.        Procedure   Procedures       Assessment/Plan      Diagnosis Plan   1. Coronary artery disease involving native coronary artery of native heart with angina pectoris (CMS/HCC)  Stress Test With Myocardial Perfusion One Day   2. Precordial pain  Stress Test With Myocardial Perfusion One Day   3. Shortness of breath  Stress Test With Myocardial Perfusion One Day     1.  The patient has had increasing chest discomfort and shortness of air as of recent.  I feel she needs repeat ischemia assessment and will schedule her for nuclear stress test for further evaluation.    2.  Otherwise, I feel she is on appropriate medications.  She does get slightly hypotensive at times, particularly over the last 3 days or so.  In that setting, I have asked that she decrease carvedilol to 6.25 mg twice a day and follow blood pressure and heart rates closely.  She will call us for any issues in that regard.    3.  We will see her back to review results of stress test and response to the change of medications as above.  She will call for any complications prior to follow-up.  Further pending above.            Patient's Body mass index is 31.98 kg/m². BMI is above normal parameters. Recommendations include: educational material and referral to primary care.             Electronically signed by:

## 2019-08-05 RX ORDER — CARVEDILOL 12.5 MG/1
12.5 TABLET ORAL 2 TIMES DAILY
Qty: 180 TABLET | Refills: 3 | Status: SHIPPED | OUTPATIENT
Start: 2019-08-05 | End: 2020-03-19 | Stop reason: SDUPTHER

## 2019-08-06 ENCOUNTER — HOSPITAL ENCOUNTER (OUTPATIENT)
Dept: CARDIOLOGY | Facility: HOSPITAL | Age: 59
Discharge: HOME | End: 2019-08-06

## 2019-08-06 DIAGNOSIS — R07.2 PRECORDIAL PAIN: ICD-10-CM

## 2019-08-06 DIAGNOSIS — I25.119 CORONARY ARTERY DISEASE INVOLVING NATIVE CORONARY ARTERY OF NATIVE HEART WITH ANGINA PECTORIS (HCC): ICD-10-CM

## 2019-08-06 DIAGNOSIS — R06.02 SHORTNESS OF BREATH: ICD-10-CM

## 2019-08-06 PROCEDURE — A9500 TC99M SESTAMIBI: HCPCS | Performed by: INTERNAL MEDICINE

## 2019-08-06 PROCEDURE — 0 TECHNETIUM SESTAMIBI: Performed by: INTERNAL MEDICINE

## 2019-08-06 PROCEDURE — 78452 HT MUSCLE IMAGE SPECT MULT: CPT | Performed by: INTERNAL MEDICINE

## 2019-08-06 PROCEDURE — 93017 CV STRESS TEST TRACING ONLY: CPT

## 2019-08-06 PROCEDURE — 93018 CV STRESS TEST I&R ONLY: CPT | Performed by: INTERNAL MEDICINE

## 2019-08-06 PROCEDURE — 78452 HT MUSCLE IMAGE SPECT MULT: CPT

## 2019-08-06 RX ADMIN — TECHNETIUM TC 99M SESTAMIBI 1 DOSE: 1 INJECTION INTRAVENOUS at 13:08

## 2019-08-08 LAB
BH CV NUCLEAR PRIOR STUDY: 3
BH CV STRESS BP STAGE 1: NORMAL
BH CV STRESS BP STAGE 2: NORMAL
BH CV STRESS DURATION MIN STAGE 1: 3
BH CV STRESS DURATION MIN STAGE 2: 3
BH CV STRESS DURATION SEC STAGE 1: 0
BH CV STRESS DURATION SEC STAGE 2: 0
BH CV STRESS GRADE STAGE 1: 10
BH CV STRESS GRADE STAGE 2: 12
BH CV STRESS HR STAGE 1: 115
BH CV STRESS HR STAGE 2: 142
BH CV STRESS METS STAGE 1: 5
BH CV STRESS METS STAGE 2: 7.5
BH CV STRESS PROTOCOL 1: NORMAL
BH CV STRESS RECOVERY BP: NORMAL MMHG
BH CV STRESS RECOVERY HR: 89 BPM
BH CV STRESS SPEED STAGE 1: 1.7
BH CV STRESS SPEED STAGE 2: 2.5
BH CV STRESS STAGE 1: 1
BH CV STRESS STAGE 2: 2
MAXIMAL PREDICTED HEART RATE: 163 BPM
PERCENT MAX PREDICTED HR: 87.12 %
STRESS BASELINE BP: NORMAL MMHG
STRESS BASELINE HR: 81 BPM
STRESS PERCENT HR: 102 %
STRESS POST ESTIMATED WORKLOAD: 7 METS
STRESS POST EXERCISE DUR MIN: 6 MIN
STRESS POST EXERCISE DUR SEC: 45 SEC
STRESS POST PEAK BP: NORMAL MMHG
STRESS POST PEAK HR: 142 BPM
STRESS TARGET HR: 139 BPM

## 2019-08-09 ENCOUNTER — TELEPHONE (OUTPATIENT)
Dept: CARDIOLOGY | Facility: CLINIC | Age: 59
End: 2019-08-09

## 2019-08-09 NOTE — TELEPHONE ENCOUNTER
Patient called requesting results of stress test. Was notified of results. She will keep appt on 9/19/19 to go over everything in more detail.  TERRELL RUBY

## 2019-08-21 ENCOUNTER — APPOINTMENT (OUTPATIENT)
Dept: CARDIOLOGY | Facility: HOSPITAL | Age: 59
End: 2019-08-21

## 2019-09-09 DIAGNOSIS — R06.09 DYSPNEA ON EXERTION: ICD-10-CM

## 2019-09-09 RX ORDER — CLOPIDOGREL BISULFATE 75 MG/1
TABLET ORAL
Qty: 30 TABLET | Refills: 0 | Status: SHIPPED | OUTPATIENT
Start: 2019-09-09 | End: 2019-09-19 | Stop reason: SDUPTHER

## 2019-09-19 ENCOUNTER — OFFICE VISIT (OUTPATIENT)
Dept: CARDIOLOGY | Facility: CLINIC | Age: 59
End: 2019-09-19

## 2019-09-19 VITALS
SYSTOLIC BLOOD PRESSURE: 97 MMHG | HEART RATE: 74 BPM | OXYGEN SATURATION: 95 % | WEIGHT: 191.8 LBS | DIASTOLIC BLOOD PRESSURE: 67 MMHG | BODY MASS INDEX: 31.96 KG/M2 | HEIGHT: 65 IN

## 2019-09-19 DIAGNOSIS — R06.02 SHORTNESS OF BREATH: ICD-10-CM

## 2019-09-19 DIAGNOSIS — I95.9 HYPOTENSION, UNSPECIFIED HYPOTENSION TYPE: ICD-10-CM

## 2019-09-19 DIAGNOSIS — I25.10 CORONARY ARTERY DISEASE INVOLVING NATIVE CORONARY ARTERY OF NATIVE HEART WITHOUT ANGINA PECTORIS: Primary | ICD-10-CM

## 2019-09-19 PROCEDURE — 99213 OFFICE O/P EST LOW 20 MIN: CPT | Performed by: PHYSICIAN ASSISTANT

## 2019-09-19 NOTE — PROGRESS NOTES
Problem list     Subjective   Bonnie Mohr is a 59 y.o. female     Chief Complaint   Patient presents with   • Coronary Artery Disease   PROBLEM LIST:         1.  Coronary artery disease  1.1 Cardiac catheterization February 2018 because of posterolateral ischemia and elevated TID with high-grade stenosis of the RCA status post stenting.  70-80% ostial stenosis of the LAD with medical management recommended and percutaneous intervention for refractory symptoms  1.2 [facility] 07/13/18 demonstrated stenting X2 to LAD and stenting X1 to RCA  2.  Preserved systolic function  3.  Psoriatic arthritis  4.  Diabetes mellitus  5.  Hypertension  6.  Dyslipidemia     HPI  The patient presents back today to review study results.  The patient had been scheduled for a nuclear stress test because of increasing episodes of chest pain.  Her nuclear stress test indicated no evidence of ischemia.  Post stress EF was preserved.  The patient tells me now that her chest pain has resolved.  She has had no further chest discomfort basically since doing the testing.  She has stable dyspnea.  She has no PND orthopnea.  She has rare palpitations, no sustained dysrhythmic activity.  She is hypertensive today and reports that this is a trending pattern for her now.  When very hypotensive, she has weakness and dizziness.  She has no further complaints otherwise at this time.    Current Outpatient Medications   Medication Sig Dispense Refill   • aspirin 81 MG tablet Take 81 mg by mouth Daily.     • atorvastatin (LIPITOR) 40 MG tablet TAKE 1 TABLET BY MOUTH EVERY DAY 90 tablet 3   • benazepril (LOTENSIN) 10 MG tablet Take 10 mg by mouth Daily.  0   • busPIRone (BUSPAR) 15 MG tablet Take 15 mg by mouth 2 (Two) Times a Day.  5   • carvedilol (COREG) 12.5 MG tablet Take 1 tablet by mouth 2 (Two) Times a Day. 180 tablet 3   • clopidogrel (PLAVIX) 75 MG tablet TAKE 1 TABLET BY MOUTH EVERY DAY 30 tablet 0   • cyclobenzaprine (FLEXERIL) 10 MG tablet Take 10  mg by mouth 2 (Two) Times a Day.  0   • ENBREL SURECLICK 50 MG/ML solution auto-injector Take As Directed.     • gabapentin (NEURONTIN) 300 MG capsule Take 300 mg by mouth every night at bedtime.  0   • hydrochlorothiazide (HYDRODIURIL) 12.5 MG tablet Take 12.5 mg by mouth Daily.  0   • HYDROcodone-acetaminophen (NORCO) 5-325 MG per tablet Take 1 tablet by mouth 2 (Two) Times a Day.  0   • hydrOXYzine (ATARAX) 10 MG tablet Take 10 mg by mouth As Needed.  2   • Insulin Glargine (BASAGLAR KWIKPEN) 100 UNIT/ML injection pen Inject 50 Units under the skin into the appropriate area as directed Daily.  1   • isosorbide mononitrate (IMDUR) 30 MG 24 hr tablet TAKE 1 TABLET BY MOUTH EVERY MORNING 30 tablet 0   • loratadine (CLARITIN) 10 MG tablet Take 10 mg by mouth Daily.  0   • montelukast (SINGULAIR) 10 MG tablet Take 10 mg by mouth Daily.  0   • Multiple Vitamin (MULTI VITAMIN PO) Take  by mouth Daily.     • nitroglycerin (NITROSTAT) 0.4 MG SL tablet 1 under the tongue as needed for angina, may repeat q5mins for up three doses 25 tablet 11   • omeprazole (priLOSEC) 20 MG capsule Take 20 mg by mouth Daily.  0   • PARoxetine (PAXIL) 40 MG tablet Take 40 mg by mouth Daily.  0   • Semaglutide (OZEMPIC) 0.25 or 0.5 MG/DOSE solution pen-injector Inject  under the skin into the appropriate area as directed 1 (One) Time Per Week.     • STEGLATRO 15 MG tablet Take 15 mg by mouth Daily.       No current facility-administered medications for this visit.        Erythromycin    Past Medical History:   Diagnosis Date   • Arthritis    • Coronary artery disease    • Diabetes mellitus (CMS/HCC)    • Hyperlipidemia    • Hypertension    • Psoriasis    • Psoriatic arthritis (CMS/HCC)        Social History     Socioeconomic History   • Marital status:      Spouse name: Not on file   • Number of children: Not on file   • Years of education: Not on file   • Highest education level: Not on file   Tobacco Use   • Smoking status: Never  "Smoker   • Smokeless tobacco: Never Used   Substance and Sexual Activity   • Alcohol use: No   • Drug use: No   • Sexual activity: Defer       Family History   Problem Relation Age of Onset   • Heart disease Mother    • Heart disease Father    • Heart disease Sister    • Diabetes Sister    • Heart disease Brother        Review of Systems   Constitutional: Positive for fatigue (easily fatigued).   HENT: Positive for congestion (head/nasal drainage from allergies). Negative for rhinorrhea and sneezing.    Eyes: Positive for visual disturbance (wears glasse daily).   Respiratory: Positive for cough (dry cough from allergies/drainage), chest tightness (chest tightness from URI with drainage) and shortness of breath (easily SOA ; worse on exertion ). Negative for wheezing.    Cardiovascular: Positive for palpitations (occasional palpitations). Negative for chest pain and leg swelling.   Gastrointestinal: Negative.  Negative for abdominal pain, nausea and vomiting.   Endocrine: Negative.  Negative for cold intolerance and heat intolerance.   Genitourinary: Negative.  Negative for difficulty urinating, frequency and urgency.   Musculoskeletal: Positive for arthralgias (joints). Negative for back pain, neck pain and neck stiffness.   Skin: Positive for rash (from psoriasis). Negative for wound.   Allergic/Immunologic: Negative.  Negative for environmental allergies and food allergies.   Neurological: Negative.  Negative for dizziness, syncope, weakness and headaches.   Hematological: Bruises/bleeds easily (bleeds easily).   Psychiatric/Behavioral: Negative for agitation, confusion and sleep disturbance (denies waking up smothering/SOA). The patient is nervous/anxious (easily nervous/anxious).        Objective   Vitals:    09/19/19 1256   BP: 97/67   BP Location: Left arm   Patient Position: Sitting   Pulse: 74   SpO2: 95%   Weight: 87 kg (191 lb 12.8 oz)   Height: 165.1 cm (65\")      BP 97/67 (BP Location: Left arm, Patient " "Position: Sitting)   Pulse 74   Ht 165.1 cm (65\")   Wt 87 kg (191 lb 12.8 oz)   SpO2 95%   BMI 31.92 kg/m²    Lab Results (most recent)     None        Physical Exam   Constitutional: She is oriented to person, place, and time. She appears well-developed and well-nourished. No distress.   HENT:   Head: Normocephalic and atraumatic.   Mouth/Throat: Uvula is midline. No oral lesions.   Eyes: Conjunctivae, EOM and lids are normal. Pupils are equal, round, and reactive to light. Lids are everted and swept, no foreign bodies found.   Neck: Normal range of motion. Neck supple. Normal carotid pulses, no hepatojugular reflux and no JVD present. Carotid bruit is not present. No tracheal deviation present. No thyroid mass and no thyromegaly present.   Cardiovascular: Normal rate, regular rhythm, intact distal pulses and normal pulses. Exam reveals gallop and S4 (soft). Exam reveals no S3, no friction rub and no decreased pulses.   No murmur heard.  Pulses:       Carotid pulses are 2+ on the right side, and 2+ on the left side.       Radial pulses are 2+ on the right side, and 2+ on the left side.        Dorsalis pedis pulses are 2+ on the right side, and 2+ on the left side.        Posterior tibial pulses are 2+ on the right side, and 2+ on the left side.   Pulmonary/Chest: Effort normal and breath sounds normal. She has no decreased breath sounds (normal EXP. phase, normal breath sound intensity ). She has no wheezes. She has no rhonchi. She has no rales.   Abdominal: Soft. Bowel sounds are normal. She exhibits no distension, no abdominal bruit and no mass. There is no tenderness. There is no rebound and no guarding.   Negative organomegaly      Musculoskeletal: Normal range of motion. She exhibits no edema, tenderness or deformity.   Lymphadenopathy:     She has no cervical adenopathy.     She has no axillary adenopathy.   Neurological: She is alert and oriented to person, place, and time.   Skin: Skin is warm and dry. " No rash noted. No erythema. No pallor.   Psychiatric: She has a normal mood and affect. Her speech is normal and behavior is normal. Judgment and thought content normal. Cognition and memory are normal.   Nursing note and vitals reviewed.        Procedure   Procedures       Assessment/Plan      Diagnosis Plan   1. Coronary artery disease involving native coronary artery of native heart without angina pectoris     2. Shortness of breath     3. Hypotension, unspecified hypotension type       1.  The patient is stable at this time from cardiovascular standpoint.  Her chest pain has resolved.  Stress test performed in setting of chest pain was unremarkable with no evidence of ischemia.    2.  It is also noted that her shortness of air is very much stable at this time.  She has no failure dysrhythmic symptoms otherwise.    3.  The patient is hypertensive today.  I will decrease carvedilol to 6.25 mg twice a day.  She will monitor blood pressures at home and call to us for any issues.    4.  As the patient is doing well clinically and had an unremarkable stress test, I feel nothing further is warranted.  We will see her on 6-month intervals at this time, sooner for complications.            Patient's Body mass index is 31.92 kg/m². BMI is above normal parameters. Recommendations include: educational material and referral to primary care.             Electronically signed by:

## 2019-09-19 NOTE — PATIENT INSTRUCTIONS

## 2019-10-07 DIAGNOSIS — R06.09 DYSPNEA ON EXERTION: ICD-10-CM

## 2019-10-07 RX ORDER — CLOPIDOGREL BISULFATE 75 MG/1
TABLET ORAL
Qty: 30 TABLET | Refills: 2 | Status: SHIPPED | OUTPATIENT
Start: 2019-10-07 | End: 2020-01-06

## 2020-01-03 ENCOUNTER — TELEPHONE (OUTPATIENT)
Dept: CARDIOLOGY | Facility: CLINIC | Age: 60
End: 2020-01-03

## 2020-01-03 NOTE — TELEPHONE ENCOUNTER
Was able to get EKG from Dr. Lee office and Skinny Giron PA-C compared it to the last EKG we had done in our office on 8/6/19. He didn't feel like there were any significant changes. Since patient is not having any symptoms she just needs to continue current meds and keep follow up appt on 3/24/20. She can call or come in sooner as needed. Patient verbalized understanding.  TERRELL RUBY    ----- Message from Jaelyn Lopez sent at 1/2/2020  8:20 AM EST -----   PT STATES DR. LEE DID AN EKG AND THAT IT WAS DIFFERENT THAN ONE'S PRIOR. [phone] IS HIS NUMBER FOR REQUESTING IT, AND SHE CAN BE REACHED [phone]

## 2020-01-05 DIAGNOSIS — R06.09 DYSPNEA ON EXERTION: ICD-10-CM

## 2020-01-06 RX ORDER — CLOPIDOGREL BISULFATE 75 MG/1
TABLET ORAL
Qty: 30 TABLET | Refills: 2 | Status: SHIPPED | OUTPATIENT
Start: 2020-01-06 | End: 2020-03-19 | Stop reason: SDUPTHER

## 2020-02-14 DIAGNOSIS — R07.2 PRECORDIAL PAIN: Primary | ICD-10-CM

## 2020-02-17 ENCOUNTER — LAB (OUTPATIENT)
Dept: LAB | Facility: HOSPITAL | Age: 60
End: 2020-02-17

## 2020-02-17 DIAGNOSIS — R07.2 PRECORDIAL PAIN: ICD-10-CM

## 2020-02-17 PROCEDURE — 36415 COLL VENOUS BLD VENIPUNCTURE: CPT

## 2020-02-17 PROCEDURE — 80048 BASIC METABOLIC PNL TOTAL CA: CPT | Performed by: PHYSICIAN ASSISTANT

## 2020-02-18 LAB
ANION GAP SERPL CALCULATED.3IONS-SCNC: 13.7 MMOL/L (ref 5–15)
BUN BLD-MCNC: 13 MG/DL (ref 6–20)
BUN/CREAT SERPL: 15.5 (ref 7–25)
CALCIUM SPEC-SCNC: 9.2 MG/DL (ref 8.6–10.5)
CHLORIDE SERPL-SCNC: 101 MMOL/L (ref 98–107)
CO2 SERPL-SCNC: 26.3 MMOL/L (ref 22–29)
CREAT BLD-MCNC: 0.84 MG/DL (ref 0.57–1)
GFR SERPL CREATININE-BSD FRML MDRD: 70 ML/MIN/1.73
GLUCOSE BLD-MCNC: 234 MG/DL (ref 65–99)
POTASSIUM BLD-SCNC: 3.9 MMOL/L (ref 3.5–5.2)
SODIUM BLD-SCNC: 141 MMOL/L (ref 136–145)

## 2020-02-20 ENCOUNTER — TELEPHONE (OUTPATIENT)
Dept: CARDIOLOGY | Facility: CLINIC | Age: 60
End: 2020-02-20

## 2020-02-20 NOTE — TELEPHONE ENCOUNTER
Pt LVM requesting her lab results.   #[id number]        Informed pt of her elevated Glucose and that she may want to speak w/ PCP about this. Informed her that it's lower than the last glucose we have which is from 2 years ago, she verbalized understanding.

## 2020-03-19 ENCOUNTER — OFFICE VISIT (OUTPATIENT)
Dept: CARDIOLOGY | Facility: CLINIC | Age: 60
End: 2020-03-19

## 2020-03-19 VITALS
DIASTOLIC BLOOD PRESSURE: 74 MMHG | OXYGEN SATURATION: 96 % | SYSTOLIC BLOOD PRESSURE: 115 MMHG | HEART RATE: 76 BPM | WEIGHT: 197.8 LBS | HEIGHT: 65 IN | BODY MASS INDEX: 32.96 KG/M2

## 2020-03-19 DIAGNOSIS — I10 ESSENTIAL HYPERTENSION: ICD-10-CM

## 2020-03-19 DIAGNOSIS — R06.09 DYSPNEA ON EXERTION: ICD-10-CM

## 2020-03-19 DIAGNOSIS — I25.10 CORONARY ARTERY DISEASE INVOLVING NATIVE CORONARY ARTERY OF NATIVE HEART WITHOUT ANGINA PECTORIS: Primary | ICD-10-CM

## 2020-03-19 PROCEDURE — 99213 OFFICE O/P EST LOW 20 MIN: CPT | Performed by: PHYSICIAN ASSISTANT

## 2020-03-19 RX ORDER — CARVEDILOL 12.5 MG/1
12.5 TABLET ORAL 2 TIMES DAILY
Qty: 180 TABLET | Refills: 3 | Status: SHIPPED | OUTPATIENT
Start: 2020-03-19 | End: 2020-09-07

## 2020-03-19 RX ORDER — ATORVASTATIN CALCIUM 40 MG/1
40 TABLET, FILM COATED ORAL DAILY
Qty: 90 TABLET | Refills: 3 | Status: SHIPPED | OUTPATIENT
Start: 2020-03-19 | End: 2020-06-15

## 2020-03-19 RX ORDER — HYDROCHLOROTHIAZIDE 12.5 MG/1
12.5 TABLET ORAL DAILY
Qty: 90 TABLET | Refills: 3 | Status: SHIPPED | OUTPATIENT
Start: 2020-03-19 | End: 2020-09-22 | Stop reason: SDUPTHER

## 2020-03-19 RX ORDER — CLOPIDOGREL BISULFATE 75 MG/1
75 TABLET ORAL DAILY
Qty: 90 TABLET | Refills: 3 | Status: SHIPPED | OUTPATIENT
Start: 2020-03-19 | End: 2020-09-22 | Stop reason: SDUPTHER

## 2020-03-19 RX ORDER — ADALIMUMAB 40MG/0.8ML
40 KIT SUBCUTANEOUS
COMMUNITY
Start: 2020-03-02

## 2020-03-19 NOTE — PATIENT INSTRUCTIONS
How to Quarantine at Home  Information for Patients and Families    These instructions are for people with confirmed or suspected COVID-19 who do not need to be hospitalized and those with confirmed COVID-19 who were hospitalized and discharged to care for themselves at home.    If you were tested through the Health Department  The Health Department will monitor your wellbeing.  If it is determined that you do not need to be hospitalized and can be isolated at home, you will be monitored by staff from your local or state health department.     If you were tested through a Commercial [facility]  You will need to monitor yourself and report changes in your symptoms to your doctor.  See the section below called Monitor Your Symptoms.    Follow these steps until a healthcare provider or local or state health department says you can return to your normal activities.    Stay home except to get medical care  • Restrict activities outside your home, except for getting medical care.   • Do not go to work, school, or public areas.   • Avoid using public transportation, ride-sharing, or taxis.    Separate yourself from other people and animals in your home  People  As much as possible, you should stay in a specific room and away from other people in your home. Also, you should use a separate bathroom, if available.    Animals  You should restrict contact with pets and other animals while you are sick with COVID-19, just like you would around other people. When possible, have another member of your household care for your animals while you are sick. If you are sick with COVID-19, avoid contact with your pet, including petting, snuggling, being kissed or licked, and sharing food. If you must care for your pet or be around animals while you are sick, wash your hands before and after you interact with pets and wear a facemask. See COVID-19 and Animals for more information.    Call ahead before visiting your doctor  If you have a medical  appointment, call the healthcare provider and tell them that you have or may have COVID-19. This information will help the healthcare provider’s office take steps to keep other people from getting infected or exposed.    Wear a facemask  You should wear a facemask when you are around other people (e.g., sharing a room or vehicle) or pets and before you enter a healthcare provider’s office.     If you are not able to wear a facemask (for example, because it causes trouble breathing), then people who live with you should not stay in the same room with you, or they should wear a facemask if they enter your room.    Cover your coughs and sneezes  • Cover your mouth and nose with a tissue when you cough or sneeze.   • Throw used tissues in a lined trash can.   • Immediately wash your hands with soap and water for at least 20 seconds or, if soap and water are not available, clean your hands with an alcohol-based hand  that contains at least 60% alcohol.    Clean your hands often  • Wash your hands often with soap and water for at least 20 seconds, especially after blowing your nose, coughing, or sneezing; going to the bathroom; and before eating or preparing food.     • If soap and water are not readily available, use an alcohol-based hand  with at least 60% alcohol, covering all surfaces of your hands and rubbing them together until they feel dry.    • Soap and water are the best option if hands are visibly dirty. Avoid touching your eyes, nose, and mouth with unwashed hands.    Avoid sharing personal household items  • You should not share dishes, drinking glasses, cups, eating utensils, towels, or bedding with other people or pets in your home.   • After using these items, they should be washed thoroughly with soap and water.    Clean all “high-touch” surfaces everyday  • High touch surfaces include counters, tabletops, doorknobs, bathroom fixtures, toilets, phones, keyboards, tablets, and bedside  tables.   • Also, clean any surfaces that may have blood, stool, or body fluids on them.   • Use a household cleaning spray or wipe, according to the label instructions. Labels contain instructions for safe and effective use of the cleaning product, including precautions you should take when applying the product, such as wearing gloves and making sure you have good ventilation during use of the product.    Monitor your symptoms  • Seek prompt medical attention if your illness is worsening (e.g., difficulty breathing).   • Before seeking care, call your healthcare provider and tell them that you have, or are being evaluated for, COVID-19.   • Put on a facemask before you enter the facility.     • These steps will help the healthcare provider’s office to keep other people in the office or waiting room from getting infected or exposed.   • Persons who are placed under active monitoring or facilitated self-monitoring should follow instructions provided by their local health department or occupational health professionals, as appropriate.  • If you have a medical emergency and need to call 911, notify the dispatch personnel that you have, or are being evaluated for COVID-19. If possible, put on a facemask before emergency medical services arrive.    Discontinuing home isolation  Patients with confirmed COVID-19 should remain under home isolation precautions until the risk of secondary transmission to others is thought to be low. The decision to discontinue home isolation precautions should be made on a case-by-case basis, in consultation with healthcare providers and state and local health departments.    The below content are for household members, intimate partners, and caregivers of a patient with symptomatic laboratory-confirmed COVID-19 or a patient under investigation:    Household members, intimate partners, and caregivers may have close contact with a person with symptomatic, laboratory-confirmed COVID-19 or a  person under investigation.     Close contacts should monitor their health; they should call their healthcare provider right away if they develop symptoms suggestive of COVID-19 (e.g., fever, cough, shortness of breath)     Close contacts should also follow these recommendations:  • Make sure that you understand and can help the patient follow their healthcare provider’s instructions for medication(s) and care. You should help the patient with basic needs in the home and provide support for getting groceries, prescriptions, and other personal needs.  • Monitor the patient’s symptoms. If the patient is getting sicker, call his or her healthcare provider and tell them that the patient has laboratory-confirmed COVID-19. This will help the healthcare provider’s office take steps to keep other people in the office or waiting room from getting infected. Ask the healthcare provider to call the local or [facility] health department for additional guidance. If the patient has a medical emergency and you need to call 911, notify the dispatch personnel that the patient has, or is being evaluated for COVID-19.  • Household members should stay in another room or be  from the patient as much as possible. Household members should use a separate bedroom and bathroom, if available.  • Prohibit visitors who do not have an essential need to be in the home.  • Household members should care for any pets in the home. Do not handle pets or other animals while sick.  For more information, see COVID-19 and Animals.  • Make sure that shared spaces in the home have good air flow, such as by an air conditioner or an opened window, weather permitting.  • Perform hand hygiene frequently. Wash your hands often with soap and water for at least 20 seconds or use an alcohol-based hand  that contains 60 to 95% alcohol, covering all surfaces of your hands and rubbing them together until they feel dry. Soap and water should be used  preferentially if hands are visibly dirty.  • Avoid touching your eyes, nose, and mouth with unwashed hands.  • The patient should wear a facemask when you are around other people. If the patient is not able to wear a facemask (for example, because it causes trouble breathing), you, as the caregiver, should wear a mask when you are in the same room as the patient.  • Wear a disposable facemask and gloves when you touch or have contact with the patient’s blood, stool, or body fluids, such as saliva, sputum, nasal mucus, vomit, or urine.   o Throw out disposable facemasks and gloves after using them. Do not reuse.  o When removing personal protective equipment, first remove and dispose of gloves. Then, immediately clean your hands with soap and water or alcohol-based hand . Next, remove and dispose of facemask, and immediately clean your hands again with soap and water or alcohol-based hand .  • Avoid sharing household items with the patient. You should not share dishes, drinking glasses, cups, eating utensils, towels, bedding, or other items. After the patient uses these items, you should wash them thoroughly (see below “Wash laundry thoroughly”).  • Clean all “high-touch” surfaces, such as counters, tabletops, doorknobs, bathroom fixtures, toilets, phones, keyboards, tablets, and bedside tables, every day. Also, clean any surfaces that may have blood, stool, or body fluids on them.   o Use a household cleaning spray or wipe, according to the label instructions. Labels contain instructions for safe and effective use of the cleaning product including precautions you should take when applying the product, such as wearing gloves and making sure you have good ventilation during use of the product.  • Wash laundry thoroughly.   o Immediately remove and wash clothes or bedding that have blood, stool, or body fluids on them.  o Wear disposable gloves while handling soiled items and keep soiled items away  from your body. Clean your hands (with soap and water or an alcohol-based hand ) immediately after removing your gloves.  o Read and follow directions on labels of laundry or clothing items and detergent. In general, using a normal laundry detergent according to washing machine instructions and dry thoroughly using the warmest temperatures recommended on the clothing label.  • Place all used disposable gloves, facemasks, and other contaminated items in a lined container before disposing of them with other household waste. Clean your hands (with soap and water or an alcohol-based hand ) immediately after handling these items. Soap and water should be used preferentially if hands are visibly dirty.  • Discuss any additional questions with your state or local health department or healthcare provider.    Adapted from information provided by the Centers for Disease Control and Prevention.  For more information, visit https://www.cdc.gov/coronavirus/2019-ncov/hcp/guidance-prevent-spread.htmlFat and Cholesterol Restricted Eating Plan  Getting too much fat and cholesterol in your diet may cause health problems. Choosing the right foods helps keep your fat and cholesterol at normal levels. This can keep you from getting certain diseases.  Your doctor may recommend an eating plan that includes:  · Total fat: ______% or less of total calories a day.  · Saturated fat: ______% or less of total calories a day.  · Cholesterol: less than _________mg a day.  · Fiber: ______g a day.  What are tips for following this plan?  Meal planning  · At meals, divide your plate into four equal parts:  ? Fill one-half of your plate with vegetables and green salads.  ? Fill one-fourth of your plate with whole grains.  ? Fill one-fourth of your plate with low-fat (lean) protein foods.  · Eat fish that is high in omega-3 fats at least two times a week. This includes mackerel, tuna, sardines, and salmon.  · Eat foods that are high  "in fiber, such as whole grains, beans, apples, broccoli, carrots, peas, and barley.  General tips    · Work with your doctor to lose weight if you need to.  · Avoid:  ? Foods with added sugar.  ? Fried foods.  ? Foods with partially hydrogenated oils.  · Limit alcohol intake to no more than 1 drink a day for nonpregnant women and 2 drinks a day for men. One drink equals 12 oz of beer, 5 oz of wine, or 1½ oz of hard liquor.  Reading food labels  · Check food labels for:  ? Trans fats.  ? Partially hydrogenated oils.  ? Saturated fat (g) in each serving.  ? Cholesterol (mg) in each serving.  ? Fiber (g) in each serving.  · Choose foods with healthy fats, such as:  ? Monounsaturated fats.  ? Polyunsaturated fats.  ? Omega-3 fats.  · Choose grain products that have whole grains. Look for the word \"whole\" as the first word in the ingredient list.  Cooking  · Cook foods using low-fat methods. These include baking, boiling, grilling, and broiling.  · Eat more home-cooked foods. Eat at restaurants and buffets less often.  · Avoid cooking using saturated fats, such as butter, cream, palm oil, palm kernel oil, and coconut oil.  Recommended foods    Fruits  · All fresh, canned (in natural juice), or frozen fruits.  Vegetables  · Fresh or frozen vegetables (raw, steamed, roasted, or grilled). Green salads.  Grains  · Whole grains, such as whole wheat or whole grain breads, crackers, cereals, and pasta. Unsweetened oatmeal, bulgur, barley, quinoa, or brown rice. Corn or whole wheat flour tortillas.  Meats and other protein foods  · Ground beef (85% or leaner), grass-fed beef, or beef trimmed of fat. Skinless chicken or turkey. Ground chicken or turkey. Pork trimmed of fat. All fish and seafood. Egg whites. Dried beans, peas, or lentils. Unsalted nuts or seeds. Unsalted canned beans. Nut butters without added sugar or oil.  Dairy  · Low-fat or nonfat dairy products, such as skim or 1% milk, 2% or reduced-fat cheeses, low-fat and " fat-free ricotta or cottage cheese, or plain low-fat and nonfat yogurt.  Fats and oils  · Tub margarine without trans fats. Light or reduced-fat mayonnaise and salad dressings. Avocado. Olive, canola, sesame, or safflower oils.  The items listed above may not be a complete list of foods and beverages you can eat. Contact a dietitian for more information.  Foods to avoid  Fruits  · Canned fruit in heavy syrup. Fruit in cream or butter sauce. Fried fruit.  Vegetables  · Vegetables cooked in cheese, cream, or butter sauce. Fried vegetables.  Grains  · White bread. White pasta. White rice. Cornbread. Bagels, pastries, and croissants. Crackers and snack foods that contain trans fat and hydrogenated oils.  Meats and other protein foods  · Fatty cuts of meat. Ribs, chicken wings, rico, sausage, bologna, salami, chitterlings, fatback, hot dogs, bratwurst, and packaged lunch meats. Liver and organ meats. Whole eggs and egg yolks. Chicken and turkey with skin. Fried meat.  Dairy  · Whole or 2% milk, cream, half-and-half, and cream cheese. Whole milk cheeses. Whole-fat or sweetened yogurt. Full-fat cheeses. Nondairy creamers and whipped toppings. Processed cheese, cheese spreads, and cheese curds.  Beverages  · Alcohol. Sugar-sweetened drinks such as sodas, lemonade, and fruit drinks.  Fats and oils  · Butter, stick margarine, lard, shortening, ghee, or rico fat. Coconut, palm kernel, and palm oils.  Sweets and desserts  · Corn syrup, sugars, honey, and molasses. Candy. Jam and jelly. Syrup. Sweetened cereals. Cookies, pies, cakes, donuts, muffins, and ice cream.  The items listed above may not be a complete list of foods and beverages you should avoid. Contact a dietitian for more information.  Summary  · Choosing the right foods helps keep your fat and cholesterol at normal levels. This can keep you from getting certain diseases.  · At meals, fill one-half of your plate with vegetables and green salads.  · Eat high-fiber  "foods, like whole grains, beans, apples, carrots, peas, and barley.  · Limit added sugar, saturated fats, alcohol, and fried foods.  This information is not intended to replace advice given to you by your health care provider. Make sure you discuss any questions you have with your health care provider.  Document Released: 06/18/2013 Document Revised: 08/21/2019 Document Reviewed: 09/04/2018  [employer] Interactive Patient Education © 2020 [employer]  BMI for Adults    Body mass index (BMI) is a number that is calculated from a person's weight and height. BMI may help to estimate how much of a person's weight is composed of fat. BMI can help identify those who may be at higher risk for certain medical problems.  How is BMI used with adults?  BMI is used as a screening tool to identify possible weight problems. It is used to check whether a person is obese, overweight, healthy weight, or underweight.  How is BMI calculated?  BMI measures your weight and compares it to your height. This can be done either in English ([place] or metric measurements. Note that charts are available to help you find your BMI quickly and easily without having to do these calculations yourself.  To calculate your BMI in English ([place] measurements, your health care provider will:  1. Measure your weight in pounds (lb).  2. Multiply the number of pounds by 703.  ? For example, for a person who weighs 180 lb, multiply that number by 703, which equals 126,540.  3. Measure your height in inches (in). Then multiply that number by itself to get a measurement called \"inches squared.\"  ? For example, for a person who is 70 in tall, the \"inches squared\" measurement is 70 in x 70 in, which equals 4900 inches squared.  4. Divide the total from Step 2 (number of lb x 703) by the total from Step 3 (inches squared): 126,540 ÷ 4900 = 25.8. This is your BMI.  To calculate your BMI in metric measurements, your health care provider will:  1. Measure your " "weight in kilograms (kg).  2. Measure your height in meters (m). Then multiply that number by itself to get a measurement called \"meters squared.\"  ? For example, for a person who is 1.75 m tall, the \"meters squared\" measurement is 1.75 m x 1.75 m, which is equal to 3.1 meters squared.  3. Divide the number of kilograms (your weight) by the meters squared number. In this example: 70 ÷ 3.1 = 22.6. This is your BMI.  How is BMI interpreted?  To interpret your results, your health care provider will use BMI charts to identify whether you are underweight, normal weight, overweight, or obese. The following guidelines will be used:  · Underweight: BMI less than 18.5.  · Normal weight: BMI between 18.5 and 24.9.  · Overweight: BMI between 25 and 29.9.  · Obese: BMI of 30 and above.  Please note:  · Weight includes both fat and muscle, so someone with a muscular build, such as an athlete, may have a BMI that is higher than 24.9. In cases like these, BMI is not an accurate measure of body fat.  · To determine if excess body fat is the cause of a BMI of 25 or higher, further assessments may need to be done by a health care provider.  · BMI is usually interpreted in the same way for men and women.  Why is BMI a useful tool?  BMI is useful in two ways:  · Identifying a weight problem that may be related to a medical condition, or that may increase the risk for medical problems.  · Promoting lifestyle and diet changes in order to reach a healthy weight.  Summary  · Body mass index (BMI) is a number that is calculated from a person's weight and height.  · BMI may help to estimate how much of a person's weight is composed of fat. BMI can help identify those who may be at higher risk for certain medical problems.  · BMI can be measured using English measurements or metric measurements.  · To interpret your results, your health care provider will use BMI charts to identify whether you are underweight, normal weight, overweight, or " obese.  This information is not intended to replace advice given to you by your health care provider. Make sure you discuss any questions you have with your health care provider.  Document Released: 08/29/2005 Document Revised: 10/31/2018 Document Reviewed: 10/31/2018  [employer] Interactive Patient Education © 2020 [employer]

## 2020-03-19 NOTE — PROGRESS NOTES
Problem list     Subjective   Bonnie Mohr is a 59 y.o. female     Chief Complaint   Patient presents with   • Coronary Artery Disease     Here for a follow up      PROBLEM LIST:         1.  Coronary artery disease  1.1 Cardiac catheterization February 2018 because of posterolateral ischemia and elevated TID with high-grade stenosis of the RCA status post stenting.  70-80% ostial stenosis of the LAD with medical management recommended and percutaneous intervention for refractory symptoms  1.2 [facility] 07/13/18 demonstrated stenting X2 to LAD and stenting X1 to RCA  2.  Preserved systolic function  3.  Psoriatic arthritis  4.  Diabetes mellitus  5.  Hypertension  6.  Dyslipidemia       HPI  The patient presents in today for evaluation and follow-up.  She continues to do well from cardiovascular standpoint.  The patient has started reporting episodes of hypotension.  She also notes orthostasis at times.  She reports no episodes of chest discomfort.  The patient reports stable dyspnea.  The patient has no PND or orthopnea.  Exercise capacity is poor but at baseline.  Labs are followed with her primary care provider and felt to be largely normal.  The patient has no further complaints otherwise and feels that she is doing well.  Of note, the patient had a nuclear stress test just last summer.  That indicated no evidence of ischemia and was felt stable otherwise.    Current Outpatient Medications on File Prior to Visit   Medication Sig Dispense Refill   • aspirin 81 MG tablet Take 81 mg by mouth Daily.     • benazepril (LOTENSIN) 5 MG tablet Take 5 mg by mouth Daily.  0   • busPIRone (BUSPAR) 30 MG tablet Take 30 mg by mouth 2 (Two) Times a Day.  5   • cyclobenzaprine (FLEXERIL) 10 MG tablet Take 10 mg by mouth 2 (Two) Times a Day.  0   • gabapentin (NEURONTIN) 300 MG capsule Take 300 mg by mouth every night at bedtime.  0   • HUMIRA PEN 40 MG/0.8ML Pen-injector Kit Inject 40 mg under the skin into the appropriate area as  directed Every 14 (Fourteen) Days.     • HYDROcodone-acetaminophen (NORCO) 5-325 MG per tablet Take 1 tablet by mouth 2 (Two) Times a Day.  0   • hydrOXYzine (ATARAX) 10 MG tablet Take 10 mg by mouth As Needed.  2   • Insulin Glargine (BASAGLAR KWIKPEN) 100 UNIT/ML injection pen Inject 50 Units under the skin into the appropriate area as directed Daily.  1   • isosorbide mononitrate (IMDUR) 30 MG 24 hr tablet TAKE 1 TABLET BY MOUTH EVERY MORNING 30 tablet 0   • loratadine (CLARITIN) 10 MG tablet Take 10 mg by mouth Daily.  0   • montelukast (SINGULAIR) 10 MG tablet Take 10 mg by mouth Daily.  0   • Multiple Vitamin (MULTI VITAMIN PO) Take  by mouth Daily.     • nitroglycerin (NITROSTAT) 0.4 MG SL tablet 1 under the tongue as needed for angina, may repeat q5mins for up three doses 25 tablet 11   • omeprazole (priLOSEC) 20 MG capsule Take 20 mg by mouth Daily.  0   • PARoxetine (PAXIL) 40 MG tablet Take 40 mg by mouth Daily.  0   • Semaglutide (OZEMPIC) 0.25 or 0.5 MG/DOSE solution pen-injector Inject  under the skin into the appropriate area as directed 1 (One) Time Per Week.     • STEGLATRO 15 MG tablet Take 15 mg by mouth Daily.     • [DISCONTINUED] atorvastatin (LIPITOR) 40 MG tablet TAKE 1 TABLET BY MOUTH EVERY DAY 90 tablet 3   • [DISCONTINUED] carvedilol (COREG) 12.5 MG tablet Take 1 tablet by mouth 2 (Two) Times a Day. 180 tablet 3   • [DISCONTINUED] clopidogrel (PLAVIX) 75 MG tablet TAKE 1 TABLET BY MOUTH EVERY DAY 30 tablet 0   • [DISCONTINUED] clopidogrel (PLAVIX) 75 MG tablet TAKE 1 TABLET BY MOUTH EVERY DAY 30 tablet 2   • [DISCONTINUED] hydrochlorothiazide (HYDRODIURIL) 12.5 MG tablet Take 12.5 mg by mouth Daily.  0   • ENBREL SURECLICK 50 MG/ML solution auto-injector Take As Directed.       No current facility-administered medications on file prior to visit.        Erythromycin    Past Medical History:   Diagnosis Date   • Arthritis    • Coronary artery disease    • Diabetes mellitus (CMS/HCC)    •  Hyperlipidemia    • Hypertension    • Psoriasis    • Psoriatic arthritis (CMS/HCC)        Social History     Socioeconomic History   • Marital status:      Spouse name: Not on file   • Number of children: Not on file   • Years of education: Not on file   • Highest education level: Not on file   Tobacco Use   • Smoking status: Never Smoker   • Smokeless tobacco: Never Used   Substance and Sexual Activity   • Alcohol use: No   • Drug use: No   • Sexual activity: Defer       Family History   Problem Relation Age of Onset   • Heart disease Mother    • Heart attack Mother    • Diabetes Mother    • Heart disease Father    • Heart disease Sister    • Diabetes Sister    • Heart disease Brother        Review of Systems   Constitutional: Positive for fatigue. Negative for chills and fever.   HENT: Positive for postnasal drip. Negative for congestion, rhinorrhea and sore throat.    Eyes: Positive for visual disturbance (glasses daily ).   Respiratory: Positive for shortness of breath (when bending over ). Negative for chest tightness.    Cardiovascular: Positive for chest pain (some pains in chest a few days ago, pains will sometimes radiates to back of left shoulder ) and palpitations. Negative for leg swelling.   Gastrointestinal: Negative.  Negative for abdominal pain, blood in stool, nausea and vomiting.   Endocrine: Negative.  Negative for cold intolerance and heat intolerance.   Genitourinary: Negative.  Negative for dysuria, frequency, hematuria and urgency.   Musculoskeletal: Positive for arthralgias (joints ) and back pain (pain in entire back ).   Skin: Positive for rash (psoriasis ). Negative for wound.   Allergic/Immunologic: Negative.  Negative for environmental allergies and food allergies.   Neurological: Positive for light-headedness. Negative for dizziness and weakness.   Hematological: Bruises/bleeds easily.   Psychiatric/Behavioral: Negative for agitation, confusion and sleep disturbance (denies  "waking with smothering ). The patient is nervous/anxious (easily anxious ).        Objective   Vitals:    03/19/20 1348   BP: 115/74   BP Location: Left arm   Patient Position: Sitting   Pulse: 76   SpO2: 96%   Weight: 89.7 kg (197 lb 12.8 oz)   Height: 165.1 cm (65\")      /74 (BP Location: Left arm, Patient Position: Sitting)   Pulse 76   Ht 165.1 cm (65\")   Wt 89.7 kg (197 lb 12.8 oz)   SpO2 96%   BMI 32.92 kg/m²    Lab Results (most recent)     None        Physical Exam   Constitutional: She is oriented to person, place, and time. She appears well-developed and well-nourished. No distress.   HENT:   Head: Normocephalic and atraumatic.   Mouth/Throat: Uvula is midline. No oral lesions.   Eyes: Pupils are equal, round, and reactive to light. Conjunctivae, EOM and lids are normal. Lids are everted and swept, no foreign bodies found.   Neck: Normal range of motion. Neck supple. Normal carotid pulses, no hepatojugular reflux and no JVD present. Carotid bruit is not present. No tracheal deviation present. No thyroid mass and no thyromegaly present.   Cardiovascular: Normal rate, regular rhythm, intact distal pulses and normal pulses. Exam reveals gallop and S4 (soft). Exam reveals no S3, no friction rub and no decreased pulses.   No murmur heard.  Pulses:       Carotid pulses are 2+ on the right side, and 2+ on the left side.       Radial pulses are 2+ on the right side, and 2+ on the left side.        Dorsalis pedis pulses are 2+ on the right side, and 2+ on the left side.        Posterior tibial pulses are 2+ on the right side, and 2+ on the left side.   Pulmonary/Chest: Effort normal and breath sounds normal. She has no decreased breath sounds (normal EXP. phase, normal breath sound intensity ). She has no wheezes. She has no rhonchi. She has no rales.   Abdominal: Soft. Bowel sounds are normal. She exhibits no distension, no abdominal bruit and no mass. There is no tenderness. There is no rebound and no " guarding.   Negative organomegaly      Musculoskeletal: Normal range of motion. She exhibits no edema, tenderness or deformity.   Lymphadenopathy:     She has no cervical adenopathy.     She has no axillary adenopathy.   Neurological: She is alert and oriented to person, place, and time.   Skin: Skin is warm and dry. No rash noted. No erythema. No pallor.   Psychiatric: She has a normal mood and affect. Her speech is normal and behavior is normal. Judgment and thought content normal. Cognition and memory are normal.   Nursing note and vitals reviewed.        Procedure   Procedures       Assessment/Plan      Diagnosis Plan   1. Coronary artery disease involving native coronary artery of native heart without angina pectoris     2. Dyspnea on exertion  clopidogrel (PLAVIX) 75 MG tablet   3. Essential hypertension       1.  At this time, the patient appears stable from cardiovascular standpoint.  She denies current symptoms of angina.  She has no cardiac complications or symptoms otherwise.  Her dyspnea is stable and at baseline.    2.  Blood pressures appear to be hypotensive as of recent.  She has also had orthostasis noted recently.  I have asked that she decrease isosorbide from 30 to 15 mg.  If persistent, she will discontinue that after 2 weeks.  Should she continue to have issues with that, I would then consider lowering or discontinuing hydrochlorothiazide therapy.    3.  As the patient is stable otherwise, nothing further.  Her stress test from just last summer indicated no evidence of ischemia.  She is doing well otherwise.  We will make no changes outside of the above and see her back in 6 months.  Of note, we did give her refills of requested medications.           Bonnie Mohr  reports that she has never smoked. She has never used smokeless tobacco..        Patient's Body mass index is 32.92 kg/m². BMI is above normal parameters. Recommendations include: educational material and referral to primary care.              Electronically signed by:

## 2020-06-10 ENCOUNTER — APPOINTMENT (OUTPATIENT)
Dept: WOMENS IMAGING | Facility: HOSPITAL | Age: 60
End: 2020-06-10

## 2020-06-10 PROCEDURE — 77063 BREAST TOMOSYNTHESIS BI: CPT | Performed by: RADIOLOGY

## 2020-06-10 PROCEDURE — 77067 SCR MAMMO BI INCL CAD: CPT | Performed by: RADIOLOGY

## 2020-06-15 RX ORDER — ATORVASTATIN CALCIUM 40 MG/1
TABLET, FILM COATED ORAL
Qty: 90 TABLET | Refills: 3 | Status: SHIPPED | OUTPATIENT
Start: 2020-06-15 | End: 2020-09-22 | Stop reason: SDUPTHER

## 2020-09-07 RX ORDER — CARVEDILOL 12.5 MG/1
TABLET ORAL
Qty: 180 TABLET | Refills: 0 | Status: SHIPPED | OUTPATIENT
Start: 2020-09-07 | End: 2020-09-22 | Stop reason: SDUPTHER

## 2020-09-22 ENCOUNTER — OFFICE VISIT (OUTPATIENT)
Dept: CARDIOLOGY | Facility: CLINIC | Age: 60
End: 2020-09-22

## 2020-09-22 VITALS
DIASTOLIC BLOOD PRESSURE: 67 MMHG | TEMPERATURE: 97.5 F | SYSTOLIC BLOOD PRESSURE: 111 MMHG | BODY MASS INDEX: 32.72 KG/M2 | HEIGHT: 65 IN | WEIGHT: 196.4 LBS | HEART RATE: 80 BPM | OXYGEN SATURATION: 94 %

## 2020-09-22 DIAGNOSIS — R06.09 DYSPNEA ON EXERTION: ICD-10-CM

## 2020-09-22 DIAGNOSIS — I10 ESSENTIAL HYPERTENSION: ICD-10-CM

## 2020-09-22 DIAGNOSIS — I25.10 CORONARY ARTERY DISEASE INVOLVING NATIVE CORONARY ARTERY OF NATIVE HEART WITHOUT ANGINA PECTORIS: Primary | ICD-10-CM

## 2020-09-22 PROCEDURE — 93000 ELECTROCARDIOGRAM COMPLETE: CPT | Performed by: PHYSICIAN ASSISTANT

## 2020-09-22 PROCEDURE — 99213 OFFICE O/P EST LOW 20 MIN: CPT | Performed by: PHYSICIAN ASSISTANT

## 2020-09-22 RX ORDER — HYDROCHLOROTHIAZIDE 12.5 MG/1
12.5 TABLET ORAL DAILY
Qty: 90 TABLET | Refills: 3 | Status: SHIPPED | OUTPATIENT
Start: 2020-09-22 | End: 2021-03-22 | Stop reason: SDUPTHER

## 2020-09-22 RX ORDER — NITROGLYCERIN 0.4 MG/1
TABLET SUBLINGUAL
Qty: 25 TABLET | Refills: 11 | Status: SHIPPED | OUTPATIENT
Start: 2020-09-22

## 2020-09-22 RX ORDER — FOLIC ACID/MULTIVIT,IRON,MINER 0.4MG-18MG
TABLET ORAL DAILY
COMMUNITY

## 2020-09-22 RX ORDER — ISOSORBIDE MONONITRATE 30 MG/1
30 TABLET, EXTENDED RELEASE ORAL EVERY MORNING
Qty: 90 TABLET | Refills: 3 | Status: SHIPPED | OUTPATIENT
Start: 2020-09-22 | End: 2021-03-22 | Stop reason: SDUPTHER

## 2020-09-22 RX ORDER — ATORVASTATIN CALCIUM 40 MG/1
40 TABLET, FILM COATED ORAL DAILY
Qty: 90 TABLET | Refills: 3 | Status: SHIPPED | OUTPATIENT
Start: 2020-09-22 | End: 2021-03-22 | Stop reason: SDUPTHER

## 2020-09-22 RX ORDER — BENAZEPRIL HYDROCHLORIDE 5 MG/1
5 TABLET, FILM COATED ORAL DAILY
Qty: 90 TABLET | Refills: 3 | Status: SHIPPED | OUTPATIENT
Start: 2020-09-22 | End: 2021-03-22 | Stop reason: SDUPTHER

## 2020-09-22 RX ORDER — CLOPIDOGREL BISULFATE 75 MG/1
75 TABLET ORAL DAILY
Qty: 90 TABLET | Refills: 3 | Status: SHIPPED | OUTPATIENT
Start: 2020-09-22 | End: 2021-03-22 | Stop reason: SDUPTHER

## 2020-09-22 RX ORDER — CARVEDILOL 12.5 MG/1
12.5 TABLET ORAL 2 TIMES DAILY
Qty: 180 TABLET | Refills: 3 | Status: SHIPPED | OUTPATIENT
Start: 2020-09-22 | End: 2021-03-22 | Stop reason: SDUPTHER

## 2020-09-22 NOTE — PATIENT INSTRUCTIONS

## 2020-09-22 NOTE — PROGRESS NOTES
Problem list     Subjective   Bonnie Mohr is a 60 y.o. female     Chief Complaint   Patient presents with   • Follow-up     Here for a 6 month follow up    • Chest Pain   • Palpitations   • Shortness of Breath   PROBLEM LIST:         1.  Coronary artery disease  1.1 Cardiac catheterization February 2018 because of posterolateral ischemia and elevated TID with high-grade stenosis of the RCA status post stenting.  70-80% ostial stenosis of the LAD with medical management recommended and percutaneous intervention for refractory symptoms  1.2 [facility] 07/13/18 demonstrated stenting X2 to LAD and stenting X1 to RCA  2.  Preserved systolic function  3.  Psoriatic arthritis  4.  Diabetes mellitus  5.  Hypertension  6.  Dyslipidemia     HPI  The patient presents into the clinic today for routine evaluation and follow-up.  At last evaluation, she complained of hypotension and orthostasis otherwise.  We decreased isosorbide from 30 to 15 mg daily.  Most of those symptoms have now resolved.  The patient currently has no chest pain.  She has stable dyspnea.  She has no failure or dysrhythmic symptoms.  Blood pressures remain extremely well controlled.  Labs are followed with her primary care provider and were normal as of recent.  The patient has no further complaints otherwise and feels that she is doing well.    Current Outpatient Medications on File Prior to Visit   Medication Sig Dispense Refill   • aspirin 81 MG tablet Take 81 mg by mouth Daily.     • atorvastatin (LIPITOR) 40 MG tablet TAKE 1 TABLET BY MOUTH EVERY DAY 90 tablet 3   • benazepril (LOTENSIN) 5 MG tablet Take 5 mg by mouth Daily.  0   • busPIRone (BUSPAR) 30 MG tablet Take 30 mg by mouth 2 (Two) Times a Day.  5   • carvedilol (COREG) 12.5 MG tablet TAKE 1 TABLET BY MOUTH TWICE DAILY 180 tablet 0   • clopidogrel (PLAVIX) 75 MG tablet Take 1 tablet by mouth Daily. 90 tablet 3   • cyclobenzaprine (FLEXERIL) 10 MG tablet Take 10 mg by mouth 2 (Two) Times a Day.  0      • gabapentin (NEURONTIN) 300 MG capsule Take 300 mg by mouth every night at bedtime.  0   • HUMIRA PEN 40 MG/0.8ML Pen-injector Kit Inject 40 mg under the skin into the appropriate area as directed Every 14 (Fourteen) Days.     • hydroCHLOROthiazide (HYDRODIURIL) 12.5 MG tablet Take 1 tablet by mouth Daily. 90 tablet 3   • HYDROcodone-acetaminophen (NORCO) 5-325 MG per tablet Take 1 tablet by mouth 2 (Two) Times a Day.  0   • hydrOXYzine (ATARAX) 10 MG tablet Take 10 mg by mouth As Needed.  2   • Insulin Glargine (BASAGLAR KWIKPEN) 100 UNIT/ML injection pen Inject 50 Units under the skin into the appropriate area as directed Daily.  1   • isosorbide mononitrate (IMDUR) 30 MG 24 hr tablet TAKE 1 TABLET BY MOUTH EVERY MORNING 30 tablet 0   • Krill Oil 350 MG capsule Take  by mouth Daily.     • loratadine (CLARITIN) 10 MG tablet Take 10 mg by mouth Daily.  0   • montelukast (SINGULAIR) 10 MG tablet Take 10 mg by mouth Daily.  0   • Multiple Vitamin (MULTI VITAMIN PO) Take  by mouth Daily.     • nitroglycerin (NITROSTAT) 0.4 MG SL tablet 1 under the tongue as needed for angina, may repeat q5mins for up three doses 25 tablet 11   • omeprazole (priLOSEC) 20 MG capsule Take 20 mg by mouth Daily.  0   • PARoxetine (PAXIL) 40 MG tablet Take 40 mg by mouth Daily.  0   • Semaglutide (OZEMPIC) 0.25 or 0.5 MG/DOSE solution pen-injector Inject  under the skin into the appropriate area as directed 1 (One) Time Per Week.     • STEGLATRO 15 MG tablet Take 15 mg by mouth Daily.     • [DISCONTINUED] ENBREL SURECLICK 50 MG/ML solution auto-injector Take As Directed.     • [DISCONTINUED] Insulin Glargine (BASAGLAR KWIKPEN SC) ADM UP  UNI SC QD UTD       No current facility-administered medications on file prior to visit.        Erythromycin    Past Medical History:   Diagnosis Date   • Arthritis    • Coronary artery disease    • Diabetes mellitus (CMS/HCC)    • Hyperlipidemia    • Hypertension    • Psoriasis    • Psoriatic  arthritis (CMS/HCC)        Social History     Socioeconomic History   • Marital status:      Spouse name: Not on file   • Number of children: Not on file   • Years of education: Not on file   • Highest education level: Not on file   Tobacco Use   • Smoking status: Never Smoker   • Smokeless tobacco: Never Used   Substance and Sexual Activity   • Alcohol use: No   • Drug use: No   • Sexual activity: Defer       Family History   Problem Relation Age of Onset   • Heart disease Mother    • Heart attack Mother    • Diabetes Mother    • Heart disease Father    • Heart disease Sister    • Diabetes Sister    • Heart disease Brother        Review of Systems   Constitutional: Negative.  Negative for chills, diaphoresis, fatigue and fever.   HENT:        Sinus issues   Eyes: Positive for visual disturbance.   Respiratory: Positive for shortness of breath (with daily activity). Negative for apnea, cough, chest tightness and wheezing.    Cardiovascular: Positive for chest pain (occas) and palpitations (flutters). Negative for leg swelling.   Gastrointestinal: Positive for constipation. Negative for abdominal pain, blood in stool, diarrhea, nausea and vomiting.   Endocrine: Negative.    Genitourinary: Negative.  Negative for hematuria.   Musculoskeletal: Positive for arthralgias, back pain and neck pain. Negative for myalgias.   Skin: Negative.  Negative for rash and wound.   Allergic/Immunologic: Positive for environmental allergies. Negative for food allergies.   Neurological: Negative.  Negative for dizziness, syncope, weakness, light-headedness, numbness and headaches.   Hematological: Bruises/bleeds easily.   Psychiatric/Behavioral: Positive for agitation and sleep disturbance (insomnia). The patient is nervous/anxious.        Objective   Vitals:    09/22/20 1048   BP: 111/67   BP Location: Left arm   Patient Position: Sitting   Pulse: 80   Temp: 97.5 °F (36.4 °C)   SpO2: 94%   Weight: 89.1 kg (196 lb 6.4 oz)    "  Height: 165.1 cm (65\")      /67 (BP Location: Left arm, Patient Position: Sitting)   Pulse 80   Temp 97.5 °F (36.4 °C)   Ht 165.1 cm (65\")   Wt 89.1 kg (196 lb 6.4 oz)   SpO2 94%   BMI 32.68 kg/m²    Lab Results (most recent)     None        Physical Exam  Vitals signs and nursing note reviewed.   Constitutional:       General: She is not in acute distress.     Appearance: She is well-developed.   HENT:      Head: Normocephalic and atraumatic.   Eyes:      Conjunctiva/sclera: Conjunctivae normal.      Pupils: Pupils are equal, round, and reactive to light.   Neck:      Musculoskeletal: Normal range of motion and neck supple.      Vascular: No JVD.      Trachea: No tracheal deviation.   Cardiovascular:      Rate and Rhythm: Normal rate and regular rhythm.      Heart sounds: Normal heart sounds.   Pulmonary:      Effort: Pulmonary effort is normal.      Breath sounds: Normal breath sounds.   Abdominal:      General: Bowel sounds are normal. There is no distension.      Palpations: Abdomen is soft. There is no mass.      Tenderness: There is no abdominal tenderness. There is no guarding or rebound.   Musculoskeletal: Normal range of motion.         General: No tenderness or deformity.   Skin:     General: Skin is warm and dry.      Coloration: Skin is not pale.      Findings: No erythema or rash.   Neurological:      Mental Status: She is alert and oriented to person, place, and time.   Psychiatric:         Behavior: Behavior normal.         Thought Content: Thought content normal.         Judgment: Judgment normal.           Procedure     ECG 12 Lead    Date/Time: 9/22/2020 11:01 AM  Performed by: Skinny Giron PA  Authorized by: Skinny Giron PA   Comparison: compared with previous ECG from 12/20/2019  Comparison to previous ECG: Sinus rhythm at 76, normal axis, low voltage, minor nonspecific STTW changes, and no acute changes noted.                 Assessment/Plan      Diagnosis Plan   1. " Coronary artery disease involving native coronary artery of native heart without angina pectoris     2. Essential hypertension  ECG 12 Lead   3. Dyspnea on exertion  clopidogrel (PLAVIX) 75 MG tablet     1.  At this time, the patient appears to be doing well from cardiovascular standpoint.  She denies current symptoms of angina, failure, or dysrhythmias.  Her dyspnea is stable and at baseline.    2.  The patient reports normotensive status when checked at home.  At last evaluation, we decreased isosorbide from 30 to 15 mg daily in the setting of orthostatic complaints at times.  This seems improved.  She will continue current regimen.  She will monitor blood pressures and call to us for any issues.    3.  As the patient is doing fairly well, nothing further and we will continue to see her on 6-month intervals.           Bonnie Fani  reports that she has never smoked. She has never used smokeless tobacco..       Patient's Body mass index is 32.68 kg/m². BMI is above normal parameters. Recommendations include: educational material.             Electronically signed by:

## 2020-12-21 ENCOUNTER — TELEPHONE (OUTPATIENT)
Dept: CARDIOLOGY | Facility: CLINIC | Age: 60
End: 2020-12-21

## 2020-12-21 NOTE — TELEPHONE ENCOUNTER
Erin will/ [facility] office, Ryan martinez office to let provider know why MRI of neck has been ordered.  States pt had been complaining w/ pain/swelling in soft tissue of R side neck.  Xrays were done and shown no problems of cause.  MRI was ordered to further assess.  If any questions, provider may call [phone].

## 2021-03-22 ENCOUNTER — OFFICE VISIT (OUTPATIENT)
Dept: CARDIOLOGY | Facility: CLINIC | Age: 61
End: 2021-03-22

## 2021-03-22 VITALS
SYSTOLIC BLOOD PRESSURE: 120 MMHG | DIASTOLIC BLOOD PRESSURE: 73 MMHG | OXYGEN SATURATION: 98 % | HEART RATE: 84 BPM | BODY MASS INDEX: 32.09 KG/M2 | HEIGHT: 65 IN | WEIGHT: 192.6 LBS | TEMPERATURE: 98 F

## 2021-03-22 DIAGNOSIS — I10 ESSENTIAL HYPERTENSION: ICD-10-CM

## 2021-03-22 DIAGNOSIS — R06.09 DYSPNEA ON EXERTION: ICD-10-CM

## 2021-03-22 DIAGNOSIS — R07.2 PRECORDIAL PAIN: Primary | ICD-10-CM

## 2021-03-22 DIAGNOSIS — I25.119 CORONARY ARTERY DISEASE INVOLVING NATIVE CORONARY ARTERY OF NATIVE HEART WITH ANGINA PECTORIS (HCC): ICD-10-CM

## 2021-03-22 PROCEDURE — 99214 OFFICE O/P EST MOD 30 MIN: CPT | Performed by: PHYSICIAN ASSISTANT

## 2021-03-22 RX ORDER — ATORVASTATIN CALCIUM 40 MG/1
40 TABLET, FILM COATED ORAL DAILY
Qty: 90 TABLET | Refills: 3 | Status: SHIPPED | OUTPATIENT
Start: 2021-03-22 | End: 2021-06-24 | Stop reason: SDUPTHER

## 2021-03-22 RX ORDER — BENAZEPRIL HYDROCHLORIDE 5 MG/1
5 TABLET, FILM COATED ORAL DAILY
Qty: 90 TABLET | Refills: 3 | Status: SHIPPED | OUTPATIENT
Start: 2021-03-22 | End: 2021-10-18

## 2021-03-22 RX ORDER — CARVEDILOL 12.5 MG/1
12.5 TABLET ORAL 2 TIMES DAILY
Qty: 180 TABLET | Refills: 3 | Status: SHIPPED | OUTPATIENT
Start: 2021-03-22 | End: 2021-06-03

## 2021-03-22 RX ORDER — CLOPIDOGREL BISULFATE 75 MG/1
75 TABLET ORAL DAILY
Qty: 90 TABLET | Refills: 3 | Status: SHIPPED | OUTPATIENT
Start: 2021-03-22 | End: 2021-06-24 | Stop reason: SDUPTHER

## 2021-03-22 RX ORDER — ISOSORBIDE MONONITRATE 30 MG/1
30 TABLET, EXTENDED RELEASE ORAL EVERY MORNING
Qty: 90 TABLET | Refills: 3 | Status: SHIPPED | OUTPATIENT
Start: 2021-03-22 | End: 2021-11-18

## 2021-03-22 RX ORDER — HYDROCHLOROTHIAZIDE 12.5 MG/1
12.5 TABLET ORAL DAILY
Qty: 90 TABLET | Refills: 3 | Status: SHIPPED | OUTPATIENT
Start: 2021-03-22 | End: 2022-01-31

## 2021-03-22 NOTE — PROGRESS NOTES
Problem list     Subjective   Bonnie Mohr is a 60 y.o. female     Chief Complaint   Patient presents with   • Palpitations     presents for 6 month f/u   • Hypertension   • Shortness of Breath   PROBLEM LIST:         1.  Coronary artery disease  1.1 Cardiac catheterization February 2018 because of posterolateral ischemia and elevated TID with high-grade stenosis of the RCA status post stenting.  70-80% ostial stenosis of the LAD with medical management recommended and percutaneous intervention for refractory symptoms  1.2 [facility] 07/13/18 demonstrated stenting X2 to LAD and stenting X1 to RCA  2.  Preserved systolic function  3.  Psoriatic arthritis  4.  Diabetes mellitus  5.  Hypertension  6.  Dyslipidemia     HPI  The patient presents into the clinic today for routine follow-up.  Unfortunately, since last evaluation, she has started noticing recurrent chest discomfort.  She initially felt that this could be either reflux or even arthritic related.  She is concerned with chest discomfort and would like evaluation however.  She describes both precordial aching and occasional pressure sensation.  She has no referral of the discomfort.  This can occur with exertion, or can even occur at rest.  Her baseline dyspnea has persisted but remained stable.  She has no failure or dysrhythmic symptoms.  She does follow routinely with laboratories with her primary care provider and feels that those have been normal.  She has no further complaints otherwise at this time.    Current Outpatient Medications on File Prior to Visit   Medication Sig Dispense Refill   • aspirin 81 MG tablet Take 81 mg by mouth Daily.     • busPIRone (BUSPAR) 30 MG tablet Take 30 mg by mouth 2 (Two) Times a Day.  5   • cyclobenzaprine (FLEXERIL) 10 MG tablet Take 10 mg by mouth 2 (Two) Times a Day.  0   • gabapentin (NEURONTIN) 300 MG capsule Take 300 mg by mouth every night at bedtime.  0   • HUMIRA PEN 40 MG/0.8ML Pen-injector Kit Inject 40 mg under the  skin into the appropriate area as directed Every 14 (Fourteen) Days.     • HYDROcodone-acetaminophen (NORCO) 5-325 MG per tablet Take 1 tablet by mouth 2 (Two) Times a Day.  0   • hydrOXYzine (ATARAX) 10 MG tablet Take 10 mg by mouth As Needed.  2   • Insulin Glargine (BASAGLAR KWIKPEN) 100 UNIT/ML injection pen Inject 50 Units under the skin into the appropriate area as directed Daily.  1   • Krill Oil 350 MG capsule Take  by mouth Daily.     • loratadine (CLARITIN) 10 MG tablet Take 10 mg by mouth Daily.  0   • montelukast (SINGULAIR) 10 MG tablet Take 10 mg by mouth Daily.  0   • Multiple Vitamin (MULTI VITAMIN PO) Take  by mouth Daily.     • nitroglycerin (NITROSTAT) 0.4 MG SL tablet 1 under the tongue as needed for angina, may repeat q5mins for up three doses 25 tablet 11   • Omeprazole 20 MG tablet delayed-release Take 40 mg by mouth Daily.  0   • PARoxetine (PAXIL) 40 MG tablet Take 40 mg by mouth Daily.  0   • Semaglutide (OZEMPIC) 0.25 or 0.5 MG/DOSE solution pen-injector Inject  under the skin into the appropriate area as directed 1 (One) Time Per Week.     • STEGLATRO 15 MG tablet Take 15 mg by mouth Daily.     • [DISCONTINUED] atorvastatin (LIPITOR) 40 MG tablet Take 1 tablet by mouth Daily. 90 tablet 3   • [DISCONTINUED] benazepril (LOTENSIN) 5 MG tablet Take 1 tablet by mouth Daily. 90 tablet 3   • [DISCONTINUED] carvedilol (COREG) 12.5 MG tablet Take 1 tablet by mouth 2 (Two) Times a Day. 180 tablet 3   • [DISCONTINUED] clopidogrel (PLAVIX) 75 MG tablet Take 1 tablet by mouth Daily. 90 tablet 3   • [DISCONTINUED] hydroCHLOROthiazide (HYDRODIURIL) 12.5 MG tablet Take 1 tablet by mouth Daily. 90 tablet 3   • [DISCONTINUED] isosorbide mononitrate (IMDUR) 30 MG 24 hr tablet Take 1 tablet by mouth Every Morning. 90 tablet 3     No current facility-administered medications on file prior to visit.       Erythromycin    Past Medical History:   Diagnosis Date   • Arthritis    • Coronary artery disease    •  Diabetes mellitus (CMS/[facility])    • Hyperlipidemia    • Hypertension    • Psoriasis    • Psoriatic arthritis (CMS/[facility])        Social History     Socioeconomic History   • Marital status:      Spouse name: Not on file   • Number of children: Not on file   • Years of education: Not on file   • Highest education level: Not on file   Tobacco Use   • Smoking status: Never Smoker   • Smokeless tobacco: Never Used   Substance and Sexual Activity   • Alcohol use: No   • Drug use: No   • Sexual activity: Defer       Family History   Problem Relation Age of Onset   • Heart disease Mother    • Heart attack Mother    • Diabetes Mother    • Heart disease Father    • Heart disease Sister    • Diabetes Sister    • Heart disease Brother        Review of Systems   Constitutional: Negative.  Negative for chills, diaphoresis, fatigue and fever.   HENT: Negative.    Eyes: Positive for visual disturbance.   Respiratory: Positive for shortness of breath (when climbing steps and bending over) and wheezing. Negative for apnea, cough and chest tightness.    Cardiovascular: Positive for palpitations (occas racing). Negative for chest pain and leg swelling.   Gastrointestinal: Positive for constipation. Negative for abdominal pain, blood in stool, diarrhea, nausea and vomiting.   Endocrine: Negative.    Genitourinary: Negative.  Negative for hematuria.   Musculoskeletal: Positive for arthralgias, back pain, myalgias and neck pain.   Allergic/Immunologic: Positive for environmental allergies. Negative for food allergies.   Neurological: Positive for dizziness (with quick movment and when BP drops). Negative for syncope, weakness, light-headedness, numbness and headaches.   Hematological: Bruises/bleeds easily.   Psychiatric/Behavioral: Positive for agitation and sleep disturbance (arthritis). The patient is nervous/anxious.        Objective   Vitals:    03/22/21 1027   BP: 120/73   BP Location: Left arm   Patient Position: Sitting   Pulse:  "84   Temp: 98 °F (36.7 °C)   SpO2: 98%   Weight: 87.4 kg (192 lb 9.6 oz)   Height: 165.1 cm (65\")      /73 (BP Location: Left arm, Patient Position: Sitting)   Pulse 84   Temp 98 °F (36.7 °C)   Ht 165.1 cm (65\")   Wt 87.4 kg (192 lb 9.6 oz)   SpO2 98%   BMI 32.05 kg/m²    Lab Results (most recent)     None        Physical Exam  Vitals and nursing note reviewed.   Constitutional:       General: She is not in acute distress.     Appearance: She is well-developed.   HENT:      Head: Normocephalic and atraumatic.   Eyes:      Conjunctiva/sclera: Conjunctivae normal.      Pupils: Pupils are equal, round, and reactive to light.   Neck:      Vascular: No JVD.      Trachea: No tracheal deviation.   Cardiovascular:      Rate and Rhythm: Normal rate and regular rhythm.      Heart sounds: Normal heart sounds.   Pulmonary:      Effort: Pulmonary effort is normal.      Breath sounds: Normal breath sounds.   Abdominal:      General: Bowel sounds are normal. There is no distension.      Palpations: Abdomen is soft. There is no mass.      Tenderness: There is no abdominal tenderness. There is no guarding or rebound.   Musculoskeletal:         General: No tenderness or deformity. Normal range of motion.      Cervical back: Normal range of motion and neck supple.   Skin:     General: Skin is warm and dry.      Coloration: Skin is not pale.      Findings: No erythema or rash.   Neurological:      Mental Status: She is alert and oriented to person, place, and time.   Psychiatric:         Behavior: Behavior normal.         Thought Content: Thought content normal.         Judgment: Judgment normal.           Procedure   Procedures       Assessment/Plan      Diagnosis Plan   1. Precordial pain  Adult Transthoracic Echo Complete W/ Cont if Necessary Per Protocol    Stress Test With Myocardial Perfusion One Day   2. Dyspnea on exertion  clopidogrel (PLAVIX) 75 MG tablet    Adult Transthoracic Echo Complete W/ Cont if Necessary " Per Protocol    Stress Test With Myocardial Perfusion One Day   3. Essential hypertension  Adult Transthoracic Echo Complete W/ Cont if Necessary Per Protocol    Stress Test With Myocardial Perfusion One Day   4. Coronary artery disease involving native coronary artery of native heart with angina pectoris (CMS/HCC)  Adult Transthoracic Echo Complete W/ Cont if Necessary Per Protocol    Stress Test With Myocardial Perfusion One Day     1.  The patient is started noticing recurrent episodes of chest discomfort as of recent.  She has ongoing dyspnea as well.  She is concerned about symptoms.  She would like further evaluation.  I would like to schedule the patient for a chemical nuclear stress test for ischemia assessment.    2.  We will schedule for an echocardiogram as well to evaluate systolic diastolic parameters, right-sided parameters, etc.    3.  For now, I would continue medical regimen.  We did discuss complete discontinuation of isosorbide because of recurrent orthostatic issues.  She will hold that for now but report back to immediately if symptoms increase while being off of that.  She will monitor blood pressure and clinical course otherwise once that is discontinued and will call us for any issues.    4.  We will see her back after the above and recommend her further at that time.  We did give her refills of requested medications which have now been sent to her pharmacy.  Further pending all the above.         Bonnie Hatfieldmmett  reports that she has never smoked. She has never used smokeless tobacco..         Patient's Body mass index is 32.05 kg/m². BMI is above normal parameters. Recommendations include: educational material.             Electronically signed by:

## 2021-03-22 NOTE — PATIENT INSTRUCTIONS

## 2021-05-07 ENCOUNTER — HOSPITAL ENCOUNTER (OUTPATIENT)
Dept: CARDIOLOGY | Facility: HOSPITAL | Age: 61
Discharge: HOME | End: 2021-05-07

## 2021-05-07 DIAGNOSIS — R06.09 DYSPNEA ON EXERTION: ICD-10-CM

## 2021-05-07 DIAGNOSIS — R07.2 PRECORDIAL PAIN: ICD-10-CM

## 2021-05-07 DIAGNOSIS — I10 ESSENTIAL HYPERTENSION: ICD-10-CM

## 2021-05-07 DIAGNOSIS — I25.119 CORONARY ARTERY DISEASE INVOLVING NATIVE CORONARY ARTERY OF NATIVE HEART WITH ANGINA PECTORIS (HCC): ICD-10-CM

## 2021-05-07 PROCEDURE — 0 TECHNETIUM SESTAMIBI: Performed by: INTERNAL MEDICINE

## 2021-05-07 PROCEDURE — 78452 HT MUSCLE IMAGE SPECT MULT: CPT

## 2021-05-07 PROCEDURE — 25010000002 REGADENOSON 0.4 MG/5ML SOLUTION: Performed by: INTERNAL MEDICINE

## 2021-05-07 PROCEDURE — A9500 TC99M SESTAMIBI: HCPCS | Performed by: INTERNAL MEDICINE

## 2021-05-07 PROCEDURE — 93018 CV STRESS TEST I&R ONLY: CPT | Performed by: INTERNAL MEDICINE

## 2021-05-07 PROCEDURE — 93306 TTE W/DOPPLER COMPLETE: CPT

## 2021-05-07 PROCEDURE — 78452 HT MUSCLE IMAGE SPECT MULT: CPT | Performed by: INTERNAL MEDICINE

## 2021-05-07 PROCEDURE — 93306 TTE W/DOPPLER COMPLETE: CPT | Performed by: INTERNAL MEDICINE

## 2021-05-07 PROCEDURE — 93017 CV STRESS TEST TRACING ONLY: CPT

## 2021-05-07 RX ADMIN — TECHNETIUM TC 99M SESTAMIBI 1 DOSE: 1 INJECTION INTRAVENOUS at 10:55

## 2021-05-07 RX ADMIN — TECHNETIUM TC 99M SESTAMIBI 1 DOSE: 1 INJECTION INTRAVENOUS at 09:20

## 2021-05-07 RX ADMIN — REGADENOSON 0.4 MG: 0.08 INJECTION, SOLUTION INTRAVENOUS at 10:55

## 2021-05-11 LAB
BH CV NUCLEAR PRIOR STUDY: 3
BH CV REST NUCLEAR ISOTOPE DOSE: 10 MCI
BH CV STRESS BP STAGE 1: NORMAL
BH CV STRESS COMMENTS STAGE 1: NORMAL
BH CV STRESS DOSE REGADENOSON STAGE 1: 0.4
BH CV STRESS DURATION MIN STAGE 1: 0
BH CV STRESS DURATION SEC STAGE 1: 10
BH CV STRESS HR STAGE 1: 90
BH CV STRESS NUCLEAR ISOTOPE DOSE: 30 MCI
BH CV STRESS PROTOCOL 1: NORMAL
BH CV STRESS RECOVERY BP: NORMAL MMHG
BH CV STRESS RECOVERY HR: 73 BPM
BH CV STRESS STAGE 1: 1
MAXIMAL PREDICTED HEART RATE: 161 BPM
PERCENT MAX PREDICTED HR: 55.9 %
STRESS BASELINE BP: NORMAL MMHG
STRESS BASELINE HR: 68 BPM
STRESS PERCENT HR: 66 %
STRESS POST PEAK BP: NORMAL MMHG
STRESS POST PEAK HR: 90 BPM
STRESS TARGET HR: 137 BPM

## 2021-05-13 ENCOUNTER — TELEPHONE (OUTPATIENT)
Dept: CARDIOLOGY | Facility: CLINIC | Age: 61
End: 2021-05-13

## 2021-05-13 NOTE — TELEPHONE ENCOUNTER
Spoke with patient on stress teat results - heart pump function is good  But it did show very mild ischemia and Skinny would like to see her in office to go over it in more detail.     Patient verbalized OK     Told her the girl that does the scheduling for Skinny will be calling her     AT [facility]         ----- Message from DAYNE Lamb sent at 5/12/2021  5:49 PM EDT -----  Follow-up 3 weeks or so.

## 2021-05-18 ENCOUNTER — OFFICE VISIT (OUTPATIENT)
Dept: CARDIOLOGY | Facility: CLINIC | Age: 61
End: 2021-05-18

## 2021-05-18 VITALS
OXYGEN SATURATION: 98 % | HEART RATE: 79 BPM | HEIGHT: 65 IN | SYSTOLIC BLOOD PRESSURE: 95 MMHG | BODY MASS INDEX: 31.12 KG/M2 | DIASTOLIC BLOOD PRESSURE: 61 MMHG | WEIGHT: 186.8 LBS

## 2021-05-18 DIAGNOSIS — R06.02 SHORTNESS OF BREATH: Primary | ICD-10-CM

## 2021-05-18 DIAGNOSIS — R07.2 PRECORDIAL PAIN: ICD-10-CM

## 2021-05-18 DIAGNOSIS — R94.39 ABNORMAL STRESS TEST: ICD-10-CM

## 2021-05-18 DIAGNOSIS — I25.119 ATHEROSCLEROSIS OF NATIVE CORONARY ARTERY OF NATIVE HEART WITH ANGINA PECTORIS (HCC): ICD-10-CM

## 2021-05-18 PROCEDURE — 99214 OFFICE O/P EST MOD 30 MIN: CPT | Performed by: PHYSICIAN ASSISTANT

## 2021-05-18 NOTE — PROGRESS NOTES
Problem list     Subjective   Bonnie Mohr is a 60 y.o. female     Chief Complaint   Patient presents with   • Follow-up     testing   PROBLEM LIST:         1.  Coronary artery disease  1.1 Cardiac catheterization February 2018 because of posterolateral ischemia and elevated TID with high-grade stenosis of the RCA status post stenting.  70-80% ostial stenosis of the LAD with medical management recommended and percutaneous intervention for refractory symptoms  1.2 [facility] 07/13/18 resulted in stenting X2 to LAD and stenting X1 to RCA  2.  Preserved systolic function  3.  Psoriatic arthritis  4.  Diabetes mellitus  5.  Hypertension  6.  Dyslipidemia     HPI  The patient presents back today for routine follow-up.  We had seen her last evaluation where she noted chest discomfort, dyspnea, and symptoms otherwise.  She was concerned with symptoms and wanted to proceed with testing.  She was scheduled for testing at that time.  Her stress test suggested inferoseptal and diaphragmatic ischemia, as well as possible lateral wall ischemia.  Post stress systolic function was at 72%.  She had a markedly elevated transient ischemic dilation ratio at 1.32.  We wanted to see her back today to discuss her abnormal findings.  Echo prelim is available only today.  Symptomatically, the patient still has chest discomfort.  She describes chest tightness which can occur with exertion or when stressed.  Her dyspnea has remained limiting at this time.  She denies failure or dysrhythmic symptoms.  She has no further complaints.    Current Outpatient Medications on File Prior to Visit   Medication Sig Dispense Refill   • aspirin 81 MG tablet Take 81 mg by mouth Daily.     • atorvastatin (LIPITOR) 40 MG tablet Take 1 tablet by mouth Daily. 90 tablet 3   • benazepril (LOTENSIN) 5 MG tablet Take 1 tablet by mouth Daily. 90 tablet 3   • busPIRone (BUSPAR) 30 MG tablet Take 30 mg by mouth 2 (Two) Times a Day.  5   • carvedilol (COREG) 12.5 MG tablet  Take 1 tablet by mouth 2 (Two) Times a Day. 180 tablet 3   • clopidogrel (PLAVIX) 75 MG tablet Take 1 tablet by mouth Daily. 90 tablet 3   • cyclobenzaprine (FLEXERIL) 10 MG tablet Take 10 mg by mouth 2 (Two) Times a Day.  0   • gabapentin (NEURONTIN) 300 MG capsule Take 300 mg by mouth every night at bedtime.  0   • HUMIRA PEN 40 MG/0.8ML Pen-injector Kit Inject 40 mg under the skin into the appropriate area as directed Every 14 (Fourteen) Days.     • hydroCHLOROthiazide (HYDRODIURIL) 12.5 MG tablet Take 1 tablet by mouth Daily. 90 tablet 3   • HYDROcodone-acetaminophen (NORCO) 5-325 MG per tablet Take 1 tablet by mouth 2 (Two) Times a Day.  0   • hydrOXYzine (ATARAX) 10 MG tablet Take 10 mg by mouth As Needed.  2   • Insulin Glargine (BASAGLAR KWIKPEN) 100 UNIT/ML injection pen Inject 50 Units under the skin into the appropriate area as directed Daily.  1   • Krill Oil 350 MG capsule Take  by mouth Daily.     • loratadine (CLARITIN) 10 MG tablet Take 10 mg by mouth Daily.  0   • montelukast (SINGULAIR) 10 MG tablet Take 10 mg by mouth Daily.  0   • Multiple Vitamin (MULTI VITAMIN PO) Take  by mouth Daily.     • nitroglycerin (NITROSTAT) 0.4 MG SL tablet 1 under the tongue as needed for angina, may repeat q5mins for up three doses 25 tablet 11   • Omeprazole 20 MG tablet delayed-release Take 40 mg by mouth Daily.  0   • PARoxetine (PAXIL) 40 MG tablet Take 40 mg by mouth Daily.  0   • Semaglutide (OZEMPIC) 0.25 or 0.5 MG/DOSE solution pen-injector Inject  under the skin into the appropriate area as directed 1 (One) Time Per Week.     • STEGLATRO 15 MG tablet Take 15 mg by mouth Daily.     • isosorbide mononitrate (IMDUR) 30 MG 24 hr tablet Take 1 tablet by mouth Every Morning. 90 tablet 3     No current facility-administered medications on file prior to visit.       Erythromycin    Past Medical History:   Diagnosis Date   • Arthritis    • Coronary artery disease    • Diabetes mellitus (CMS/HCC)    • Hyperlipidemia   "  • Hypertension    • Psoriasis    • Psoriatic arthritis (CMS/HCC)        Social History     Socioeconomic History   • Marital status:      Spouse name: Not on file   • Number of children: Not on file   • Years of education: Not on file   • Highest education level: Not on file   Tobacco Use   • Smoking status: Never Smoker   • Smokeless tobacco: Never Used   Substance and Sexual Activity   • Alcohol use: No   • Drug use: No   • Sexual activity: Defer       Family History   Problem Relation Age of Onset   • Heart disease Mother    • Heart attack Mother    • Diabetes Mother    • Heart disease Father    • Heart disease Sister    • Diabetes Sister    • Heart disease Brother        Review of Systems   Constitutional: Positive for fatigue. Negative for chills and fever.   HENT: Negative for congestion, rhinorrhea and sore throat.    Eyes: Positive for visual disturbance (glasses).   Respiratory: Positive for shortness of breath. Negative for chest tightness and wheezing.    Cardiovascular: Positive for palpitations. Negative for chest pain and leg swelling.   Gastrointestinal: Negative.    Endocrine: Negative.  Negative for cold intolerance.   Genitourinary: Negative.    Musculoskeletal: Positive for arthralgias, back pain and neck pain.   Skin: Negative.  Negative for rash and wound.   Allergic/Immunologic: Positive for environmental allergies.   Neurological: Positive for dizziness, weakness, numbness (hands ) and headaches.   Hematological: Bruises/bleeds easily (brusies/ bleeds).   Psychiatric/Behavioral: Positive for sleep disturbance (falling/ staying asleep ).       Objective   Vitals:    05/18/21 1035   BP: 95/61   BP Location: Left arm   Patient Position: Sitting   Pulse: 79   SpO2: 98%   Weight: 84.7 kg (186 lb 12.8 oz)   Height: 165.1 cm (65\")      BP 95/61 (BP Location: Left arm, Patient Position: Sitting)   Pulse 79   Ht 165.1 cm (65\")   Wt 84.7 kg (186 lb 12.8 oz)   SpO2 98%   BMI 31.09 kg/m²    "   Lab Results (most recent)     None        Physical Exam  Vitals and nursing note reviewed.   Constitutional:       General: She is not in acute distress.     Appearance: She is well-developed.   HENT:      Head: Normocephalic and atraumatic.   Eyes:      Conjunctiva/sclera: Conjunctivae normal.      Pupils: Pupils are equal, round, and reactive to light.   Neck:      Vascular: No JVD.      Trachea: No tracheal deviation.   Cardiovascular:      Rate and Rhythm: Normal rate and regular rhythm.      Heart sounds: Normal heart sounds.   Pulmonary:      Effort: Pulmonary effort is normal.      Breath sounds: Normal breath sounds.   Abdominal:      General: Bowel sounds are normal. There is no distension.      Palpations: Abdomen is soft. There is no mass.      Tenderness: There is no abdominal tenderness. There is no guarding or rebound.   Musculoskeletal:         General: No tenderness or deformity. Normal range of motion.      Cervical back: Normal range of motion and neck supple.   Skin:     General: Skin is warm and dry.      Coloration: Skin is not pale.      Findings: No erythema or rash.   Neurological:      Mental Status: She is alert and oriented to person, place, and time.   Psychiatric:         Behavior: Behavior normal.         Thought Content: Thought content normal.         Judgment: Judgment normal.           Procedure   Procedures       Assessment/Plan      Diagnosis Plan   1. Shortness of breath  Basic Metabolic Panel    CBC & Differential   2. Precordial pain  [facility] Cath   3. Abnormal stress test  [facility] Cath   4. Atherosclerosis of native coronary artery of native heart with angina pectoris (CMS/HCC)  [facility] Cath     1.  The patient has ongoing symptoms concerning for angina.  She has an abnormal stress test as above with inferoseptal, diaphragmatic, and possible lateral wall ischemia.  Transient ischemic dilation ratio is markedly elevated at 1.32.  Given clinical course,  cardiac history, and abnormal stress test findings, the patient will need consideration for further evaluation of coronary anatomy and will be scheduled for catheterization.  She is concerned enough with clinical course of stress test findings that she agrees to proceed on with testing at this time.    2.  For now, the patient is on appropriate medical regimen.  We will continue aspirin and Plavix in the precath setting.  I will make no adjustments in her medical regimen otherwise.    3.  She will need a CBC and BMP in the precath setting.    4.  We will see her back once we know results of catheterization and can recommend her further at that time.  She will call for any issues prior to follow-up.                        Electronically signed by:

## 2021-05-18 NOTE — PATIENT INSTRUCTIONS

## 2021-05-26 LAB
BH CV ECHO MEAS - ACS: 1.8 CM
BH CV ECHO MEAS - AO MAX PG: 5.9 MMHG
BH CV ECHO MEAS - AO MEAN PG: 3 MMHG
BH CV ECHO MEAS - AO ROOT AREA (BSA CORRECTED): 1.7
BH CV ECHO MEAS - AO ROOT AREA: 8.6 CM^2
BH CV ECHO MEAS - AO ROOT DIAM: 3.3 CM
BH CV ECHO MEAS - AO V2 MAX: 121 CM/SEC
BH CV ECHO MEAS - AO V2 MEAN: 81.7 CM/SEC
BH CV ECHO MEAS - AO V2 VTI: 25.5 CM
BH CV ECHO MEAS - BSA(HAYCOCK): 2 M^2
BH CV ECHO MEAS - BSA: 1.9 M^2
BH CV ECHO MEAS - BZI_BMI: 32 KILOGRAMS/M^2
BH CV ECHO MEAS - BZI_METRIC_HEIGHT: 165.1 CM
BH CV ECHO MEAS - BZI_METRIC_WEIGHT: 87.1 KG
BH CV ECHO MEAS - EDV(CUBED): 49.4 ML
BH CV ECHO MEAS - EDV(MOD-SP4): 64.9 ML
BH CV ECHO MEAS - EDV(TEICH): 57 ML
BH CV ECHO MEAS - EF(CUBED): 84.8 %
BH CV ECHO MEAS - EF(MOD-SP4): 53.5 %
BH CV ECHO MEAS - EF(TEICH): 78.8 %
BH CV ECHO MEAS - ESV(CUBED): 7.5 ML
BH CV ECHO MEAS - ESV(MOD-SP4): 30.2 ML
BH CV ECHO MEAS - ESV(TEICH): 12.1 ML
BH CV ECHO MEAS - FS: 46.6 %
BH CV ECHO MEAS - IVS/LVPW: 0.75
BH CV ECHO MEAS - IVSD: 0.74 CM
BH CV ECHO MEAS - LA DIMENSION: 3.1 CM
BH CV ECHO MEAS - LA/AO: 0.94
BH CV ECHO MEAS - LV DIASTOLIC VOL/BSA (35-75): 33.4 ML/M^2
BH CV ECHO MEAS - LV IVRT: 0.12 SEC
BH CV ECHO MEAS - LV MASS(C)D: 89.7 GRAMS
BH CV ECHO MEAS - LV MASS(C)DI: 46.1 GRAMS/M^2
BH CV ECHO MEAS - LV SYSTOLIC VOL/BSA (12-30): 15.5 ML/M^2
BH CV ECHO MEAS - LVIDD: 3.7 CM
BH CV ECHO MEAS - LVIDS: 2 CM
BH CV ECHO MEAS - LVLD AP4: 7.3 CM
BH CV ECHO MEAS - LVLS AP4: 6.6 CM
BH CV ECHO MEAS - LVOT AREA (M): 3.5 CM^2
BH CV ECHO MEAS - LVOT AREA: 3.5 CM^2
BH CV ECHO MEAS - LVOT DIAM: 2.1 CM
BH CV ECHO MEAS - LVPWD: 0.98 CM
BH CV ECHO MEAS - MV A MAX VEL: 72 CM/SEC
BH CV ECHO MEAS - MV DEC SLOPE: 204 CM/SEC^2
BH CV ECHO MEAS - MV E MAX VEL: 57.8 CM/SEC
BH CV ECHO MEAS - MV E/A: 0.8
BH CV ECHO MEAS - RVDD: 3.2 CM
BH CV ECHO MEAS - SI(AO): 112.2 ML/M^2
BH CV ECHO MEAS - SI(CUBED): 21.6 ML/M^2
BH CV ECHO MEAS - SI(MOD-SP4): 17.8 ML/M^2
BH CV ECHO MEAS - SI(TEICH): 23.1 ML/M^2
BH CV ECHO MEAS - SV(AO): 218.1 ML
BH CV ECHO MEAS - SV(CUBED): 41.9 ML
BH CV ECHO MEAS - SV(MOD-SP4): 34.7 ML
BH CV ECHO MEAS - SV(TEICH): 44.9 ML
MAXIMAL PREDICTED HEART RATE: 161 BPM
STRESS TARGET HR: 137 BPM

## 2021-05-27 ENCOUNTER — TELEPHONE (OUTPATIENT)
Dept: CARDIOLOGY | Facility: CLINIC | Age: 61
End: 2021-05-27

## 2021-05-27 NOTE — TELEPHONE ENCOUNTER
Patient informed of echo results, patient verbalized understanding. Leona Borjas LPN      ----- Message from DAYNE Lamb sent at 5/27/2021  8:39 AM EDT -----  Routine follow-up.  Adult Transthoracic Echo Complete W/ Cont if Necessary Per Protocol  Order: [id number]  Status:  Final result   Visible to patient:  No (not released) Dx:  Precordial pain; Essential hypertensi...  Details    Reading Physician Reading Date Result Priority   Mark Toribio MD  [phone] 5/7/2021 Routine      Result Text  1.  LV size, function, wall motion, and wall thickness are normal.  Visually estimated ejection fraction is 50 to 55%.  Grade 1 diastolic dysfunction with borderline left atrial enlargement.  Right heart chambers are normal.  No septal defect or intracavitary mass or thrombus.     2.  There is thickening of the posterior mitral leaflet as well as mild subvalvular thickening.  However, typical rheumatic changes are not present.  Doppler excludes mitral stenosis and significant mitral regurgitation.  The aortic valve is tricuspid and very minimally sclerotic but is not calcified nor stenotic.  There is mild AI.  Physiologic TR is demonstrated from a morphologically normal valve.     3.  No pericardial or great vessel pathology.     4.  Pulmonary artery pressures cannot be calculated.

## 2021-06-03 RX ORDER — CARVEDILOL 12.5 MG/1
TABLET ORAL
Qty: 180 TABLET | Refills: 3 | Status: SHIPPED | OUTPATIENT
Start: 2021-06-03

## 2021-06-24 ENCOUNTER — HOSPITAL ENCOUNTER (OUTPATIENT)
Dept: CARDIOLOGY | Facility: HOSPITAL | Age: 61
Discharge: HOME | End: 2021-06-24
Admitting: PHYSICIAN ASSISTANT

## 2021-06-24 ENCOUNTER — OFFICE VISIT (OUTPATIENT)
Dept: CARDIOLOGY | Facility: CLINIC | Age: 61
End: 2021-06-24

## 2021-06-24 VITALS
HEIGHT: 65 IN | HEART RATE: 82 BPM | WEIGHT: 190 LBS | OXYGEN SATURATION: 98 % | BODY MASS INDEX: 31.65 KG/M2 | DIASTOLIC BLOOD PRESSURE: 76 MMHG | SYSTOLIC BLOOD PRESSURE: 122 MMHG

## 2021-06-24 DIAGNOSIS — R09.89 OTHER SPECIFIED SYMPTOMS AND SIGNS INVOLVING THE CIRCULATORY AND RESPIRATORY SYSTEMS: ICD-10-CM

## 2021-06-24 DIAGNOSIS — I72.9 PSEUDOANEURYSM (HCC): ICD-10-CM

## 2021-06-24 DIAGNOSIS — I97.630 POSTOPERATIVE HEMATOMA INVOLVING CIRCULATORY SYSTEM FOLLOWING CARDIAC CATHETERIZATION: ICD-10-CM

## 2021-06-24 DIAGNOSIS — M79.601 PAIN OF RIGHT UPPER EXTREMITY: ICD-10-CM

## 2021-06-24 DIAGNOSIS — R06.09 DYSPNEA ON EXERTION: ICD-10-CM

## 2021-06-24 DIAGNOSIS — I25.10 CORONARY ARTERY DISEASE INVOLVING NATIVE CORONARY ARTERY OF NATIVE HEART WITHOUT ANGINA PECTORIS: Primary | ICD-10-CM

## 2021-06-24 PROCEDURE — 93931 UPPER EXTREMITY STUDY: CPT

## 2021-06-24 PROCEDURE — 93931 UPPER EXTREMITY STUDY: CPT | Performed by: INTERNAL MEDICINE

## 2021-06-24 PROCEDURE — 99213 OFFICE O/P EST LOW 20 MIN: CPT | Performed by: PHYSICIAN ASSISTANT

## 2021-06-24 RX ORDER — ATORVASTATIN CALCIUM 40 MG/1
40 TABLET, FILM COATED ORAL DAILY
Qty: 90 TABLET | Refills: 3 | Status: SHIPPED | OUTPATIENT
Start: 2021-06-24 | End: 2022-06-13

## 2021-06-24 RX ORDER — CLOPIDOGREL BISULFATE 75 MG/1
75 TABLET ORAL DAILY
Qty: 90 TABLET | Refills: 3 | Status: SHIPPED | OUTPATIENT
Start: 2021-06-24 | End: 2022-07-29

## 2021-06-24 NOTE — PROGRESS NOTES
Subjective   Bonnie Mohr is a 60 y.o. female     Chief Complaint   Patient presents with   • Heart cath follow up     6-18-21 one additional stent, right wrist   PROBLEM LIST:         1.  Coronary artery disease  1.1 Cardiac catheterization February 2018 because of posterolateral ischemia and elevated TID with high-grade stenosis of the RCA status post stenting.  70-80% ostial stenosis of the LAD with medical management recommended and percutaneous intervention for refractory symptoms  1.2 LHC 07/13/18 resulted in stenting X2 to LAD and stenting X1 to RCA  1.3 Repeat C, 6/21, in the setting of abnormal noninvasive studies. The patient had stenting of the mid LAD for 80% heavily calcified stenosis. She had residual 50% IntraStent stenosis of previously stented segment which was advised to manage medically. She had patent stents to the RCA. The circumflex had minimal irregularities.  2.  Preserved systolic function  3.  Psoriatic arthritis  4.  Diabetes mellitus  5.  Hypertension  6.  Dyslipidemia     HPI    The patient presents into the clinic today for routine follow-up. She was recently cathed in the setting of low level symptoms and abnormal noninvasive studies. The patient had stenting of the LAD. Post stenting, the patient still has residual weakness and dyspnea. She was attempted on Brilinta however could not tolerate that because of severe dyspnea. She discontinued that on her own and restarted Plavix. She has had no continued chest pain post stenting. She does feel that she is slightly improving symptomatically. She has no failure or dysrhythmic symptoms. Her biggest concern is with significant right radial artery pain post catheterization. She has extensive hematoma formation at cath site and extending up the forearm. She has no evidence of infection. She has no bruit or thrill by exam. She has no further complaints otherwise.        Current Outpatient Medications on File Prior to Visit   Medication Sig  Dispense Refill   • aspirin 81 MG tablet Take 81 mg by mouth Daily.     • benazepril (LOTENSIN) 5 MG tablet Take 1 tablet by mouth Daily. 90 tablet 3   • carvedilol (COREG) 12.5 MG tablet TAKE 1 TABLET BY MOUTH TWICE DAILY 180 tablet 3   • cyclobenzaprine (FLEXERIL) 10 MG tablet Take 10 mg by mouth 2 (Two) Times a Day.  0   • gabapentin (NEURONTIN) 300 MG capsule Take 300 mg by mouth every night at bedtime.  0   • HUMIRA PEN 40 MG/0.8ML Pen-injector Kit Inject 40 mg under the skin into the appropriate area as directed Every 14 (Fourteen) Days.     • hydroCHLOROthiazide (HYDRODIURIL) 12.5 MG tablet Take 1 tablet by mouth Daily. 90 tablet 3   • HYDROcodone-acetaminophen (NORCO) 5-325 MG per tablet Take 1 tablet by mouth 2 (Two) Times a Day.  0   • hydrOXYzine (ATARAX) 10 MG tablet Take 10 mg by mouth As Needed.  2   • Insulin Glargine (BASAGLAR KWIKPEN) 100 UNIT/ML injection pen Inject 50 Units under the skin into the appropriate area as directed Daily.  1   • Krill Oil 350 MG capsule Take  by mouth Daily.     • montelukast (SINGULAIR) 10 MG tablet Take 10 mg by mouth Daily.  0   • Multiple Vitamin (MULTI VITAMIN PO) Take  by mouth Daily.     • nitroglycerin (NITROSTAT) 0.4 MG SL tablet 1 under the tongue as needed for angina, may repeat q5mins for up three doses 25 tablet 11   • PARoxetine (PAXIL) 40 MG tablet Take 40 mg by mouth Daily.  0   • Semaglutide (OZEMPIC) 0.25 or 0.5 MG/DOSE solution pen-injector Inject  under the skin into the appropriate area as directed 1 (One) Time Per Week.     • STEGLATRO 15 MG tablet Take 15 mg by mouth Daily.     • [DISCONTINUED] atorvastatin (LIPITOR) 40 MG tablet Take 1 tablet by mouth Daily. 90 tablet 3   • [DISCONTINUED] clopidogrel (PLAVIX) 75 MG tablet Take 1 tablet by mouth Daily. 90 tablet 3   • busPIRone (BUSPAR) 30 MG tablet Take 30 mg by mouth 2 (Two) Times a Day.  5   • isosorbide mononitrate (IMDUR) 30 MG 24 hr tablet Take 1 tablet by mouth Every Morning. 90 tablet 3    • loratadine (CLARITIN) 10 MG tablet Take 10 mg by mouth Daily.  0   • Omeprazole 20 MG tablet delayed-release Take 40 mg by mouth Daily.  0     No current facility-administered medications on file prior to visit.       ALLERGIES    Brilinta [ticagrelor] and Erythromycin    Past Medical History:   Diagnosis Date   • Arthritis    • Coronary artery disease    • Diabetes mellitus (CMS/HCC)    • Hyperlipidemia    • Hypertension    • Psoriasis    • Psoriatic arthritis (CMS/HCC)        Social History     Socioeconomic History   • Marital status:      Spouse name: Not on file   • Number of children: Not on file   • Years of education: Not on file   • Highest education level: Not on file   Tobacco Use   • Smoking status: Never Smoker   • Smokeless tobacco: Never Used   Substance and Sexual Activity   • Alcohol use: No   • Drug use: No   • Sexual activity: Defer       Family History   Problem Relation Age of Onset   • Heart disease Mother    • Heart attack Mother    • Diabetes Mother    • Heart disease Father    • Heart disease Sister    • Diabetes Sister    • Heart disease Brother        Review of Systems   Constitutional: Positive for fatigue (at times). Negative for chills and fever.   HENT: Positive for sinus pressure. Negative for congestion and sore throat.    Eyes: Positive for visual disturbance.   Respiratory: Positive for shortness of breath (getting better). Negative for chest tightness.    Cardiovascular: Positive for chest pain (some) and palpitations (flutters at times). Negative for leg swelling.   Gastrointestinal: Positive for constipation. Negative for abdominal pain, blood in stool, diarrhea, nausea and vomiting.   Endocrine: Negative for cold intolerance and heat intolerance.   Genitourinary: Positive for frequency. Negative for dysuria, hematuria and urgency.   Musculoskeletal: Positive for arthralgias, back pain and neck pain.   Skin: Positive for wound.   Allergic/Immunologic: Positive for  "environmental allergies. Negative for food allergies.   Neurological: Positive for dizziness (if standing to quickly). Negative for syncope and light-headedness.   Hematological: Bruises/bleeds easily.   Psychiatric/Behavioral: Negative.  Negative for sleep disturbance (denies waking with soa or cp).       Objective   /76 (BP Location: Left arm, Patient Position: Sitting)   Pulse 82   Ht 165.1 cm (65\")   Wt 86.2 kg (190 lb)   SpO2 98%   BMI 31.62 kg/m²   Vitals:    06/24/21 0850   BP: 122/76   BP Location: Left arm   Patient Position: Sitting   Pulse: 82   SpO2: 98%   Weight: 86.2 kg (190 lb)   Height: 165.1 cm (65\")      Lab Results (most recent)     None        Physical Exam  Vitals and nursing note reviewed.   Constitutional:       General: She is not in acute distress.     Appearance: She is well-developed.   HENT:      Head: Normocephalic and atraumatic.   Eyes:      Conjunctiva/sclera: Conjunctivae normal.      Pupils: Pupils are equal, round, and reactive to light.   Neck:      Vascular: No JVD.      Trachea: No tracheal deviation.   Cardiovascular:      Rate and Rhythm: Normal rate and regular rhythm.      Heart sounds: Normal heart sounds.      Comments: Right radial site tenderness, hematoma, and mild erythema. The patient has fairly significant pain with only mild palpation.  Pulmonary:      Effort: Pulmonary effort is normal.      Breath sounds: Normal breath sounds.   Abdominal:      General: Bowel sounds are normal. There is no distension.      Palpations: Abdomen is soft. There is no mass.      Tenderness: There is no abdominal tenderness. There is no guarding or rebound.   Musculoskeletal:         General: No tenderness or deformity. Normal range of motion.      Cervical back: Normal range of motion and neck supple.   Skin:     General: Skin is warm and dry.      Coloration: Skin is not pale.      Findings: No erythema or rash.   Neurological:      Mental Status: She is alert and oriented " to person, place, and time.   Psychiatric:         Behavior: Behavior normal.         Thought Content: Thought content normal.         Judgment: Judgment normal.         Procedure   Procedures         Assessment/Plan      Diagnosis Plan   1. Coronary artery disease involving native coronary artery of native heart without angina pectoris     2. Dyspnea on exertion  clopidogrel (PLAVIX) 75 MG tablet   3. Pain of right upper extremity     4. Postoperative hematoma involving circulatory system following cardiac catheterization     5. Other specified symptoms and signs involving the circulatory and respiratory systems   US Arterial Doppler Upper Extremity Right     1. The patient has significant pain to the right calf site, post catheterization. She has a fairly extensive zone of hematoma. I would like to schedule for a duplex to evaluate that further. She will be scheduled as above.    2. The patient does feel improved post stenting of the LAD during catheterization as above. I feel that she is stable with regards to coronary artery disease. She was started on Brilinta but could not tolerate that because of significant dyspnea and side effects otherwise. She stopped that and resumed her Plavix which she has at home. I have given her prescriptions for refills on Plavix, as well as a atorvastatin at her request. I would make no adjustments in medications otherwise.    3. Otherwise, the patient is stable from cardiovascular standpoint. I feel she is improved post tenting. I would make no further adjustments. If her arterial duplex is unremarkable, nothing further would be indicated and we would see her routinely through the clinic.  No follow-ups on file.                 Electronically signed by:

## 2021-06-24 NOTE — PATIENT INSTRUCTIONS

## 2021-07-04 LAB
BH CV ECHO MEAS - BSA(HAYCOCK): 2 M^2
BH CV ECHO MEAS - BSA: 1.9 M^2
BH CV ECHO MEAS - BZI_BMI: 31.6 KILOGRAMS/M^2
BH CV ECHO MEAS - BZI_METRIC_HEIGHT: 165.1 CM
BH CV ECHO MEAS - BZI_METRIC_WEIGHT: 86.2 KG
BH CV UPPER DUPLEX RIGHT RADIAL ART EDV: 7 CM/S
BH CV UPPER DUPLEX RIGHT RADIAL ART PSV: 42 CM/S
MAXIMAL PREDICTED HEART RATE: 161 BPM
STRESS TARGET HR: 137 BPM

## 2021-07-07 ENCOUNTER — TELEPHONE (OUTPATIENT)
Dept: CARDIOLOGY | Facility: CLINIC | Age: 61
End: 2021-07-07

## 2021-07-07 NOTE — TELEPHONE ENCOUNTER
Patient informed of results and will keep follow up as scheduled. Radha Cueto MA     ----- Message from Dorcas Taylor MA sent at 7/7/2021  3:32 PM EDT -----    ----- Message -----  From: Skinny Giron PA  Sent: 7/6/2021   9:04 AM EDT  To: Dorcas Taylor MA    Routine follow-up.  No complication cath site noted.

## 2021-08-12 ENCOUNTER — OFFICE VISIT (OUTPATIENT)
Dept: CARDIOLOGY | Facility: CLINIC | Age: 61
End: 2021-08-12

## 2021-08-12 VITALS
HEIGHT: 65 IN | SYSTOLIC BLOOD PRESSURE: 133 MMHG | BODY MASS INDEX: 31.52 KG/M2 | WEIGHT: 189.2 LBS | OXYGEN SATURATION: 90 % | DIASTOLIC BLOOD PRESSURE: 86 MMHG | HEART RATE: 77 BPM

## 2021-08-12 DIAGNOSIS — R07.2 PRECORDIAL PAIN: ICD-10-CM

## 2021-08-12 DIAGNOSIS — I25.10 CORONARY ARTERY DISEASE INVOLVING NATIVE CORONARY ARTERY, ANGINA PRESENCE UNSPECIFIED, UNSPECIFIED WHETHER NATIVE OR TRANSPLANTED HEART: ICD-10-CM

## 2021-08-12 DIAGNOSIS — R94.39 ABNORMAL STRESS TEST: ICD-10-CM

## 2021-08-12 DIAGNOSIS — R07.9 CHEST PAIN, UNSPECIFIED TYPE: ICD-10-CM

## 2021-08-12 DIAGNOSIS — I25.119 ATHEROSCLEROSIS OF NATIVE CORONARY ARTERY OF NATIVE HEART WITH ANGINA PECTORIS (HCC): ICD-10-CM

## 2021-08-12 DIAGNOSIS — R06.09 DYSPNEA ON EXERTION: Primary | ICD-10-CM

## 2021-08-12 PROCEDURE — 93000 ELECTROCARDIOGRAM COMPLETE: CPT | Performed by: PHYSICIAN ASSISTANT

## 2021-08-12 PROCEDURE — 99214 OFFICE O/P EST MOD 30 MIN: CPT | Performed by: PHYSICIAN ASSISTANT

## 2021-08-12 RX ORDER — PANTOPRAZOLE SODIUM 40 MG/1
40 TABLET, DELAYED RELEASE ORAL DAILY
COMMUNITY

## 2021-08-12 NOTE — PROGRESS NOTES
Problem list     Subjective   Bonnie Mohr is a 60 y.o. female     Chief Complaint   Patient presents with   • Follow-up   PROBLEM LIST:         1.  Coronary artery disease  1.1 Cardiac catheterization February 2018 because of posterolateral ischemia and elevated TID with high-grade stenosis of the RCA status post stenting.  70-80% ostial stenosis of the LAD with medical management recommended and percutaneous intervention for refractory symptoms  1.2 LHC 07/13/18 resulted in stenting X2 to LAD and stenting X1 to RCA  1.3 Repeat C, 6/21, in the setting of abnormal noninvasive studies. The patient had stenting of the mid LAD for 80% heavily calcified stenosis. She had residual 50% IntraStent stenosis of previously stented segment which was advised to manage medically. She had patent stents to the RCA. The circumflex had minimal irregularities.  2.  Preserved systolic function  3.  Psoriatic arthritis  4.  Diabetes mellitus  5.  Hypertension  6.  Dyslipidemia        HPI    Patient is a 60-year-old female who presents back to the office for follow-up.  She underwent stenting in June 2021.  She underwent stenting of what appears to be the proximal LAD.  She had IntraStent 50 to 60% LAD stenosis.    She is doing well.  She still feels discomfort.  She done well but still feels the discomfort in her chest and is concerned.  She has dyspnea when trying to exert or do activity.  This is noticeable as well but she does not complain of orthopnea.  She does not complain of any severe lower extremity edema    She has felt palpitations but overall has done well.  She is concerned about her discomfort especially in the setting that she is on antianginal therapy and continues to feel it.      Current Outpatient Medications on File Prior to Visit   Medication Sig Dispense Refill   • aspirin 81 MG tablet Take 81 mg by mouth Daily.     • atorvastatin (LIPITOR) 40 MG tablet Take 1 tablet by mouth Daily. 90 tablet 3   • benazepril  (LOTENSIN) 5 MG tablet Take 1 tablet by mouth Daily. 90 tablet 3   • busPIRone (BUSPAR) 30 MG tablet Take 30 mg by mouth 2 (Two) Times a Day.  5   • carvedilol (COREG) 12.5 MG tablet TAKE 1 TABLET BY MOUTH TWICE DAILY 180 tablet 3   • clopidogrel (PLAVIX) 75 MG tablet Take 1 tablet by mouth Daily. 90 tablet 3   • cyclobenzaprine (FLEXERIL) 10 MG tablet Take 10 mg by mouth 2 (Two) Times a Day.  0   • gabapentin (NEURONTIN) 300 MG capsule Take 300 mg by mouth every night at bedtime.  0   • HUMIRA PEN 40 MG/0.8ML Pen-injector Kit Inject 40 mg under the skin into the appropriate area as directed Every 14 (Fourteen) Days.     • hydroCHLOROthiazide (HYDRODIURIL) 12.5 MG tablet Take 1 tablet by mouth Daily. 90 tablet 3   • HYDROcodone-acetaminophen (NORCO) 5-325 MG per tablet Take 1 tablet by mouth 2 (Two) Times a Day.  0   • hydrOXYzine (ATARAX) 10 MG tablet Take 10 mg by mouth As Needed.  2   • Insulin Glargine (BASAGLAR KWIKPEN) 100 UNIT/ML injection pen Inject 50 Units under the skin into the appropriate area as directed Daily.  1   • Krill Oil 350 MG capsule Take  by mouth Daily.     • montelukast (SINGULAIR) 10 MG tablet Take 10 mg by mouth Daily.  0   • Multiple Vitamin (MULTI VITAMIN PO) Take  by mouth Daily.     • nitroglycerin (NITROSTAT) 0.4 MG SL tablet 1 under the tongue as needed for angina, may repeat q5mins for up three doses 25 tablet 11   • pantoprazole (PROTONIX) 40 MG EC tablet Take 40 mg by mouth Daily.     • PARoxetine (PAXIL) 40 MG tablet Take 40 mg by mouth Daily.  0   • Semaglutide (OZEMPIC) 0.25 or 0.5 MG/DOSE solution pen-injector Inject  under the skin into the appropriate area as directed 1 (One) Time Per Week.     • STEGLATRO 15 MG tablet Take 15 mg by mouth Daily.     • isosorbide mononitrate (IMDUR) 30 MG 24 hr tablet Take 1 tablet by mouth Every Morning. 90 tablet 3   • loratadine (CLARITIN) 10 MG tablet Take 10 mg by mouth Daily.  0   • Omeprazole 20 MG tablet delayed-release Take 40 mg by  mouth Daily.  0     No current facility-administered medications on file prior to visit.       Brilinta [ticagrelor] and Erythromycin    Past Medical History:   Diagnosis Date   • Arthritis    • Coronary artery disease    • Diabetes mellitus (CMS/HCC)    • Hyperlipidemia    • Hypertension    • Psoriasis    • Psoriatic arthritis (CMS/[facility])        Social History     Socioeconomic History   • Marital status:      Spouse name: Not on file   • Number of children: Not on file   • Years of education: Not on file   • Highest education level: Not on file   Tobacco Use   • Smoking status: Never Smoker   • Smokeless tobacco: Never Used   Substance and Sexual Activity   • Alcohol use: No   • Drug use: No   • Sexual activity: Defer       Family History   Problem Relation Age of Onset   • Heart disease Mother    • Heart attack Mother    • Diabetes Mother    • Heart disease Father    • Heart disease Sister    • Diabetes Sister    • Heart disease Brother        Review of Systems   Constitutional: Positive for fatigue. Negative for chills and fever.   HENT: Negative.  Negative for congestion, rhinorrhea and sore throat.    Eyes: Positive for visual disturbance (glasses).   Respiratory: Positive for chest tightness. Negative for shortness of breath and wheezing.    Cardiovascular: Positive for chest pain and palpitations. Negative for leg swelling.   Gastrointestinal: Negative.    Endocrine: Negative.    Genitourinary: Negative.    Musculoskeletal: Positive for arthralgias, back pain and neck pain.   Skin: Positive for wound (forehead ). Negative for rash.   Allergic/Immunologic: Positive for environmental allergies.   Neurological: Positive for dizziness, weakness and numbness (legs / hands). Negative for headaches.   Hematological: Bruises/bleeds easily (bruises/ bleeds).   Psychiatric/Behavioral: Positive for sleep disturbance (staying asleep ).       Objective   Vitals:    08/12/21 1115   BP: 133/86   BP Location: Left arm  "  Patient Position: Sitting   Pulse: 77   SpO2: 90%   Weight: 85.8 kg (189 lb 3.2 oz)   Height: 165.1 cm (65\")      /86 (BP Location: Left arm, Patient Position: Sitting)   Pulse 77   Ht 165.1 cm (65\")   Wt 85.8 kg (189 lb 3.2 oz)   SpO2 90%   BMI 31.48 kg/m²     Lab Results (most recent)     None          Physical Exam  Vitals and nursing note reviewed.   Constitutional:       General: She is not in acute distress.     Appearance: Normal appearance. She is well-developed.   HENT:      Head: Normocephalic and atraumatic.   Eyes:      General: No scleral icterus.        Right eye: No discharge.         Left eye: No discharge.      Conjunctiva/sclera: Conjunctivae normal.   Neck:      Vascular: No carotid bruit.   Cardiovascular:      Rate and Rhythm: Normal rate and regular rhythm.      Heart sounds: Normal heart sounds. No murmur heard.   No friction rub. No gallop.    Pulmonary:      Effort: Pulmonary effort is normal. No respiratory distress.      Breath sounds: Normal breath sounds. No wheezing or rales.   Chest:      Chest wall: No tenderness.   Musculoskeletal:      Right lower leg: No edema.      Left lower leg: No edema.   Skin:     General: Skin is warm and dry.      Coloration: Skin is not pale.      Findings: No erythema or rash.   Neurological:      Mental Status: She is alert and oriented to person, place, and time.      Cranial Nerves: No cranial nerve deficit.   Psychiatric:         Behavior: Behavior normal.         Procedure     ECG 12 Lead    Date/Time: 8/12/2021 11:24 AM  Performed by: Anjum Robertson PA  Authorized by: Anjum Robertson PA   Comparison: compared with previous ECG from 9/22/2020  Comments: EKG demonstrates sinus rhythm at 73 bpm with no acute ST changes and low voltage noted               Assessment/Plan     Problems Addressed this Visit        Cardiac and Vasculature    Dyspnea on exertion - Primary    Relevant Orders    Stress Test With Myocardial Perfusion One Day "    Chest pain    Relevant Orders    Stress Test With Myocardial Perfusion One Day    Coronary artery disease involving native coronary artery    Relevant Orders    Stress Test With Myocardial Perfusion One Day      Diagnoses       Codes Comments    Dyspnea on exertion    -  Primary ICD-10-CM: R06.00  ICD-9-CM: 786.09     Coronary artery disease involving native coronary artery, angina presence unspecified, unspecified whether native or transplanted heart     ICD-10-CM: I25.10  ICD-9-CM: 414.01     Chest pain, unspecified type     ICD-10-CM: R07.9  ICD-9-CM: 786.50         Recommendation  1.  Patient is a 60-year-old female continues to complain of discomfort after intervention in June.  She has significant IntraStent restenosis.  Plan to repeat a stress test to see if anterior ischemia is identified.  If it is, the IntraStent restenosis may be significant enough to consider intervention especially in the setting that she is on antianginal therapy and continues to feel discomfort.  We will schedule accordingly    2.  Otherwise she is on appropriate medical therapy and continues to antiplatelet therapy.  We will continue statin therapy and labs to monitor lipids and diabetes are being done by primary.  We will see her back for follow-up after testing.  She has nitroglycerin available for chest pain as needed.  She has not had to take that medication but if any chest pain is not resolved by nitroglycerin, will recommend her going to the ER.  We will see her back for follow-up on the blood testing.  Follow-up with primary as scheduled                 Electronically signed by:

## 2021-08-16 ENCOUNTER — APPOINTMENT (OUTPATIENT)
Dept: WOMENS IMAGING | Facility: HOSPITAL | Age: 61
End: 2021-08-16

## 2021-08-16 PROCEDURE — 77067 SCR MAMMO BI INCL CAD: CPT | Performed by: RADIOLOGY

## 2021-08-16 PROCEDURE — 77063 BREAST TOMOSYNTHESIS BI: CPT | Performed by: RADIOLOGY

## 2021-08-26 ENCOUNTER — HOSPITAL ENCOUNTER (OUTPATIENT)
Dept: CARDIOLOGY | Facility: HOSPITAL | Age: 61
Discharge: HOME | End: 2021-08-26

## 2021-08-26 DIAGNOSIS — R07.9 CHEST PAIN, UNSPECIFIED TYPE: ICD-10-CM

## 2021-08-26 DIAGNOSIS — I25.10 CORONARY ARTERY DISEASE INVOLVING NATIVE CORONARY ARTERY, ANGINA PRESENCE UNSPECIFIED, UNSPECIFIED WHETHER NATIVE OR TRANSPLANTED HEART: ICD-10-CM

## 2021-08-26 DIAGNOSIS — R06.09 DYSPNEA ON EXERTION: ICD-10-CM

## 2021-08-26 PROCEDURE — 0 TECHNETIUM SESTAMIBI: Performed by: INTERNAL MEDICINE

## 2021-08-26 PROCEDURE — A9500 TC99M SESTAMIBI: HCPCS | Performed by: INTERNAL MEDICINE

## 2021-08-26 PROCEDURE — 93018 CV STRESS TEST I&R ONLY: CPT | Performed by: INTERNAL MEDICINE

## 2021-08-26 PROCEDURE — 78452 HT MUSCLE IMAGE SPECT MULT: CPT

## 2021-08-26 PROCEDURE — 93017 CV STRESS TEST TRACING ONLY: CPT

## 2021-08-26 PROCEDURE — 78452 HT MUSCLE IMAGE SPECT MULT: CPT | Performed by: INTERNAL MEDICINE

## 2021-08-26 PROCEDURE — 25010000002 REGADENOSON 0.4 MG/5ML SOLUTION: Performed by: INTERNAL MEDICINE

## 2021-08-26 RX ADMIN — REGADENOSON 0.4 MG: 0.08 INJECTION, SOLUTION INTRAVENOUS at 10:36

## 2021-08-26 RX ADMIN — TECHNETIUM TC 99M SESTAMIBI 1 DOSE: 1 INJECTION INTRAVENOUS at 08:29

## 2021-08-26 RX ADMIN — TECHNETIUM TC 99M SESTAMIBI 1 DOSE: 1 INJECTION INTRAVENOUS at 10:36

## 2021-08-28 LAB
BH CV REST NUCLEAR ISOTOPE DOSE: 10 MCI
BH CV STRESS COMMENTS STAGE 1: NORMAL
BH CV STRESS DOSE REGADENOSON STAGE 1: 0.4
BH CV STRESS DURATION MIN STAGE 1: 0
BH CV STRESS DURATION SEC STAGE 1: 10
BH CV STRESS NUCLEAR ISOTOPE DOSE: 30 MCI
BH CV STRESS PROTOCOL 1: NORMAL
BH CV STRESS RECOVERY BP: NORMAL MMHG
BH CV STRESS RECOVERY HR: 77 BPM
BH CV STRESS STAGE 1: 1
MAXIMAL PREDICTED HEART RATE: 161 BPM
PERCENT MAX PREDICTED HR: 56.52 %
STRESS BASELINE BP: NORMAL MMHG
STRESS BASELINE HR: 70 BPM
STRESS PERCENT HR: 66 %
STRESS POST PEAK BP: NORMAL MMHG
STRESS POST PEAK HR: 91 BPM
STRESS TARGET HR: 137 BPM

## 2021-08-31 ENCOUNTER — TELEPHONE (OUTPATIENT)
Dept: CARDIOLOGY | Facility: CLINIC | Age: 61
End: 2021-08-31

## 2021-08-31 ENCOUNTER — OFFICE VISIT (OUTPATIENT)
Dept: CARDIOLOGY | Facility: CLINIC | Age: 61
End: 2021-08-31

## 2021-08-31 VITALS
HEART RATE: 77 BPM | SYSTOLIC BLOOD PRESSURE: 106 MMHG | WEIGHT: 189.4 LBS | HEIGHT: 65 IN | OXYGEN SATURATION: 96 % | BODY MASS INDEX: 31.56 KG/M2 | DIASTOLIC BLOOD PRESSURE: 71 MMHG

## 2021-08-31 DIAGNOSIS — R06.09 DYSPNEA ON EXERTION: ICD-10-CM

## 2021-08-31 DIAGNOSIS — I25.118 CORONARY ARTERY DISEASE OF NATIVE ARTERY OF NATIVE HEART WITH STABLE ANGINA PECTORIS (HCC): Primary | ICD-10-CM

## 2021-08-31 DIAGNOSIS — I10 ESSENTIAL HYPERTENSION: ICD-10-CM

## 2021-08-31 PROCEDURE — 99213 OFFICE O/P EST LOW 20 MIN: CPT | Performed by: PHYSICIAN ASSISTANT

## 2021-10-18 RX ORDER — BENAZEPRIL HYDROCHLORIDE 5 MG/1
5 TABLET, FILM COATED ORAL DAILY
Qty: 90 TABLET | Refills: 3 | Status: SHIPPED | OUTPATIENT
Start: 2021-10-18 | End: 2022-11-09

## 2021-11-18 ENCOUNTER — OFFICE VISIT (OUTPATIENT)
Dept: CARDIOLOGY | Facility: CLINIC | Age: 61
End: 2021-11-18

## 2021-11-18 VITALS
BODY MASS INDEX: 31.36 KG/M2 | WEIGHT: 188.2 LBS | DIASTOLIC BLOOD PRESSURE: 63 MMHG | SYSTOLIC BLOOD PRESSURE: 103 MMHG | HEIGHT: 65 IN | HEART RATE: 73 BPM | OXYGEN SATURATION: 98 %

## 2021-11-18 DIAGNOSIS — R06.09 DYSPNEA ON EXERTION: ICD-10-CM

## 2021-11-18 DIAGNOSIS — I25.10 CORONARY ARTERY DISEASE INVOLVING NATIVE CORONARY ARTERY OF NATIVE HEART WITHOUT ANGINA PECTORIS: Primary | ICD-10-CM

## 2021-11-18 DIAGNOSIS — I10 ESSENTIAL HYPERTENSION: ICD-10-CM

## 2021-11-18 PROCEDURE — 99213 OFFICE O/P EST LOW 20 MIN: CPT | Performed by: PHYSICIAN ASSISTANT

## 2021-11-18 NOTE — PATIENT INSTRUCTIONS

## 2021-11-18 NOTE — PROGRESS NOTES
Problem list     Subjective   Bonnie Mohr is a 61 y.o. female     Chief Complaint   Patient presents with   • Follow-up     3 month    PROBLEM LIST:         1.  Coronary artery disease  1.1 Cardiac catheterization February 2018 because of posterolateral ischemia and elevated TID with high-grade stenosis of the RCA status post stenting.  70-80% ostial stenosis of the LAD with medical management recommended and percutaneous intervention for refractory symptoms  1.2 C 07/13/18 resulted in stenting X2 to LAD and stenting X1 to RCA  1.3 Repeat [facility], 6/21, in the setting of abnormal noninvasive studies. The patient had stenting of the mid LAD for 80% heavily calcified stenosis. She had residual 50% IntraStent stenosis of previously stented segment which was advised to manage medically. She had patent stents to the RCA. The circumflex had minimal irregularities.  2.  Preserved systolic function  3.  Psoriatic arthritis  4.  Diabetes mellitus  5.  Hypertension  6.  Dyslipidemia     HPI  The patient presents into the clinic today for routine evaluation and follow-up.  For the most part, the patient is done well since last evaluation.  She was followed up for an abnormal stress test at last evaluation, given recurrent symptoms post stenting in June as above.  That supported inferior and inferolateral wall ischemia.  As there was no significant disease in the RCA or circumflex at time of previous catheterization, we did not pursue further evaluation.  At this time, the patient continues to do well.  We wanted to see her back today just to review symptoms.  She currently has no chest pain.  She has stable dyspnea.  She has no failure or dysrhythmic symptoms.  She certainly has nothing to suggest recurrence of coronary disease.  She has no further complaints.    Current Outpatient Medications on File Prior to Visit   Medication Sig Dispense Refill   • aspirin 81 MG tablet Take 81 mg by mouth Daily.     • atorvastatin  (LIPITOR) 40 MG tablet Take 1 tablet by mouth Daily. 90 tablet 3   • benazepril (LOTENSIN) 5 MG tablet TAKE 1 TABLET BY MOUTH DAILY 90 tablet 3   • busPIRone (BUSPAR) 30 MG tablet Take 30 mg by mouth 2 (Two) Times a Day.  5   • carvedilol (COREG) 12.5 MG tablet TAKE 1 TABLET BY MOUTH TWICE DAILY 180 tablet 3   • clopidogrel (PLAVIX) 75 MG tablet Take 1 tablet by mouth Daily. 90 tablet 3   • cyclobenzaprine (FLEXERIL) 10 MG tablet Take 10 mg by mouth 2 (Two) Times a Day.  0   • gabapentin (NEURONTIN) 300 MG capsule Take 300 mg by mouth every night at bedtime.  0   • HUMIRA PEN 40 MG/0.8ML Pen-injector Kit Inject 40 mg under the skin into the appropriate area as directed Every 14 (Fourteen) Days.     • hydroCHLOROthiazide (HYDRODIURIL) 12.5 MG tablet Take 1 tablet by mouth Daily. 90 tablet 3   • HYDROcodone-acetaminophen (NORCO) 5-325 MG per tablet Take 1 tablet by mouth 2 (Two) Times a Day.  0   • hydrOXYzine (ATARAX) 10 MG tablet Take 10 mg by mouth As Needed.  2   • Insulin Glargine (BASAGLAR KWIKPEN) 100 UNIT/ML injection pen Inject 50 Units under the skin into the appropriate area as directed Daily.  1   • Krill Oil 350 MG capsule Take  by mouth Daily.     • loratadine (CLARITIN) 10 MG tablet Take 10 mg by mouth Daily.  0   • montelukast (SINGULAIR) 10 MG tablet Take 10 mg by mouth Daily.  0   • Multiple Vitamin (MULTI VITAMIN PO) Take  by mouth Daily.     • nitroglycerin (NITROSTAT) 0.4 MG SL tablet 1 under the tongue as needed for angina, may repeat q5mins for up three doses 25 tablet 11   • Omeprazole 20 MG tablet delayed-release Take 40 mg by mouth Daily.  0   • pantoprazole (PROTONIX) 40 MG EC tablet Take 40 mg by mouth Daily.     • PARoxetine (PAXIL) 40 MG tablet Take 40 mg by mouth Daily.  0   • Semaglutide (OZEMPIC) 0.25 or 0.5 MG/DOSE solution pen-injector Inject  under the skin into the appropriate area as directed 1 (One) Time Per Week.     • STEGLATRO 15 MG tablet Take 15 mg by mouth Daily.     •  [DISCONTINUED] isosorbide mononitrate (IMDUR) 30 MG 24 hr tablet Take 1 tablet by mouth Every Morning. 90 tablet 3     No current facility-administered medications on file prior to visit.       Brilinta [ticagrelor] and Erythromycin    Past Medical History:   Diagnosis Date   • Arthritis    • Coronary artery disease    • Diabetes mellitus (HCC)    • Hyperlipidemia    • Hypertension    • Psoriasis    • Psoriatic arthritis (HCC)        Social History     Socioeconomic History   • Marital status:    Tobacco Use   • Smoking status: Never Smoker   • Smokeless tobacco: Never Used   Substance and Sexual Activity   • Alcohol use: No   • Drug use: No   • Sexual activity: Defer       Family History   Problem Relation Age of Onset   • Heart disease Mother    • Heart attack Mother    • Diabetes Mother    • Heart disease Father    • Heart disease Sister    • Diabetes Sister    • Heart disease Brother        Review of Systems   Constitutional: Positive for fatigue. Negative for chills and fever.   HENT: Negative.  Negative for congestion, rhinorrhea and sore throat.    Eyes: Positive for visual disturbance (reading glasses).   Respiratory: Positive for shortness of breath. Negative for chest tightness and wheezing.    Cardiovascular: Positive for palpitations. Negative for chest pain and leg swelling.   Gastrointestinal: Negative.    Endocrine: Negative.    Genitourinary: Negative.    Musculoskeletal: Positive for arthralgias, back pain and neck pain.   Skin: Negative.  Negative for rash and wound.   Allergic/Immunologic: Positive for environmental allergies.   Neurological: Positive for dizziness. Negative for weakness, numbness and headaches.   Hematological: Bruises/bleeds easily (bruises/ bleeds).   Psychiatric/Behavioral: Positive for sleep disturbance (staying alseep ).       Objective   Vitals:    11/18/21 1528   BP: 103/63   BP Location: Left arm   Patient Position: Sitting   Pulse: 73   SpO2: 98%   Weight: 85.4 kg  "(188 lb 3.2 oz)   Height: 165.1 cm (65\")      /63 (BP Location: Left arm, Patient Position: Sitting)   Pulse 73   Ht 165.1 cm (65\")   Wt 85.4 kg (188 lb 3.2 oz)   SpO2 98%   BMI 31.32 kg/m²    Lab Results (most recent)     None        Physical Exam  Vitals and nursing note reviewed.   Constitutional:       General: She is not in acute distress.     Appearance: She is well-developed.   HENT:      Head: Normocephalic and atraumatic.   Eyes:      Conjunctiva/sclera: Conjunctivae normal.      Pupils: Pupils are equal, round, and reactive to light.   Neck:      Vascular: No JVD.      Trachea: No tracheal deviation.   Cardiovascular:      Rate and Rhythm: Normal rate and regular rhythm.      Heart sounds: Normal heart sounds.   Pulmonary:      Effort: Pulmonary effort is normal.      Breath sounds: Normal breath sounds.   Abdominal:      General: Bowel sounds are normal. There is no distension.      Palpations: Abdomen is soft. There is no mass.      Tenderness: There is no abdominal tenderness. There is no guarding or rebound.   Musculoskeletal:         General: No tenderness or deformity. Normal range of motion.      Cervical back: Normal range of motion and neck supple.   Skin:     General: Skin is warm and dry.      Coloration: Skin is not pale.      Findings: No erythema or rash.   Neurological:      Mental Status: She is alert and oriented to person, place, and time.   Psychiatric:         Behavior: Behavior normal.         Thought Content: Thought content normal.         Judgment: Judgment normal.           Procedure   Procedures       Assessment/Plan      Diagnosis Plan   1. Coronary artery disease involving native coronary artery of native heart without angina pectoris     2. Dyspnea on exertion     3. Essential hypertension       1.  At this time, the patient appears to be doing well from cardiovascular standpoint.  Previous stress was abnormal, but with no corresponding disease by her last " catheterization.  We have managed her medically and will continue to do so.  Clinically, the patient is doing well.    2.  I would continue medical regimen without change at this time.    3.  For change in clinical course, the patient will call immediately.  With stable dyspnea no angina or anginal equivalent symptoms otherwise, nothing further would be indicated.    4.  The patient remains normotensive.  She will monitor blood pressures and call to us for any issues.    5.  We will continue to see the patient on routine 6-month intervals.             Advance Care Planning   ACP discussion was held with the patient during this visit. Patient has an advance directive (not in EMR), copy requested.      Electronically signed by:

## 2022-01-31 RX ORDER — HYDROCHLOROTHIAZIDE 12.5 MG/1
12.5 TABLET ORAL DAILY
Qty: 90 TABLET | Refills: 3 | Status: SHIPPED | OUTPATIENT
Start: 2022-01-31

## 2022-05-24 ENCOUNTER — OFFICE VISIT (OUTPATIENT)
Dept: CARDIOLOGY | Facility: CLINIC | Age: 62
End: 2022-05-24

## 2022-05-24 VITALS
DIASTOLIC BLOOD PRESSURE: 61 MMHG | BODY MASS INDEX: 31.19 KG/M2 | SYSTOLIC BLOOD PRESSURE: 89 MMHG | HEART RATE: 72 BPM | HEIGHT: 65 IN | OXYGEN SATURATION: 97 % | WEIGHT: 187.2 LBS

## 2022-05-24 DIAGNOSIS — R06.09 DYSPNEA ON EXERTION: ICD-10-CM

## 2022-05-24 DIAGNOSIS — I10 ESSENTIAL HYPERTENSION: ICD-10-CM

## 2022-05-24 DIAGNOSIS — I25.119 ATHEROSCLEROSIS OF NATIVE CORONARY ARTERY OF NATIVE HEART WITH ANGINA PECTORIS: Primary | ICD-10-CM

## 2022-05-24 PROCEDURE — 99213 OFFICE O/P EST LOW 20 MIN: CPT | Performed by: PHYSICIAN ASSISTANT

## 2022-05-24 PROCEDURE — 93000 ELECTROCARDIOGRAM COMPLETE: CPT | Performed by: PHYSICIAN ASSISTANT

## 2022-05-24 NOTE — PROGRESS NOTES
Problem list     Subjective   Bonnie Mohr is a 61 y.o. female     Chief Complaint   Patient presents with   • Follow-up     6 month    • Coronary Artery Disease     PROBLEM LIST:         1.  Coronary artery disease  1.1 Cardiac catheterization February 2018 because of posterolateral ischemia and elevated TID with high-grade stenosis of the RCA status post stenting.  70-80% ostial stenosis of the LAD with medical management recommended and percutaneous intervention for refractory symptoms  1.2 C 07/13/18 resulted in stenting X2 to LAD and stenting X1 to RCA  1.3 Repeat [facility], 6/21, in the setting of abnormal noninvasive studies. The patient had stenting of the mid LAD for 80% heavily calcified stenosis. She had residual 50% IntraStent stenosis of previously stented segment which was advised to manage medically. She had patent stents to the RCA. The circumflex had minimal irregularities.  2.  Preserved systolic function  3.  Psoriatic arthritis  4.  Diabetes mellitus  5.  Hypertension  6.  Dyslipidemia   HPI  The patient presents in the clinic today for routine evaluation and follow-up.  She was scheduled for nuclear stress test previously which she had in August, 2021.  This supported anteroapical, inferior, and inferolateral wall ischemia.  As she had no disease correlating to this area as by previous catheterization, we did not pursue catheterization.  Ultimately, we treated her medically.  She presents back today for review of symptoms and course otherwise.  She still has chest pain.  She has this with exertion.  She has not really had to take nitro as of recent.  She has ongoing dyspnea.  She has no failure nor dysrhythmic symptoms.  She has no further complaints at this time.    Current Outpatient Medications on File Prior to Visit   Medication Sig Dispense Refill   • aspirin 81 MG tablet Take 81 mg by mouth Daily.     • atorvastatin (LIPITOR) 40 MG tablet Take 1 tablet by mouth Daily. 90 tablet 3   •  benazepril (LOTENSIN) 5 MG tablet TAKE 1 TABLET BY MOUTH DAILY 90 tablet 3   • carvedilol (COREG) 12.5 MG tablet TAKE 1 TABLET BY MOUTH TWICE DAILY 180 tablet 3   • clopidogrel (PLAVIX) 75 MG tablet Take 1 tablet by mouth Daily. 90 tablet 3   • cyclobenzaprine (FLEXERIL) 10 MG tablet Take 10 mg by mouth 2 (Two) Times a Day.  0   • Dulaglutide (TRULICITY SC) Inject  under the skin into the appropriate area as directed.     • gabapentin (NEURONTIN) 300 MG capsule Take 300 mg by mouth every night at bedtime.  0   • HUMIRA PEN 40 MG/0.8ML Pen-injector Kit Inject 40 mg under the skin into the appropriate area as directed Every 14 (Fourteen) Days.     • hydroCHLOROthiazide (HYDRODIURIL) 12.5 MG tablet TAKE 1 TABLET BY MOUTH DAILY 90 tablet 3   • HYDROcodone-acetaminophen (NORCO) 5-325 MG per tablet Take 1 tablet by mouth 2 (Two) Times a Day.  0   • hydrOXYzine (ATARAX) 25 MG tablet Take 25 mg by mouth As Needed.  2   • Insulin Glargine (BASAGLAR KWIKPEN) 100 UNIT/ML injection pen Inject 50 Units under the skin into the appropriate area as directed Daily.  1   • Krill Oil 350 MG capsule Take  by mouth Daily.     • montelukast (SINGULAIR) 10 MG tablet Take 10 mg by mouth Daily.  0   • Multiple Vitamin (MULTI VITAMIN PO) Take  by mouth Daily.     • nitroglycerin (NITROSTAT) 0.4 MG SL tablet 1 under the tongue as needed for angina, may repeat q5mins for up three doses 25 tablet 11   • Omeprazole 20 MG tablet delayed-release Take 40 mg by mouth Daily.  0   • pantoprazole (PROTONIX) 40 MG EC tablet Take 40 mg by mouth Daily.     • PARoxetine (PAXIL) 40 MG tablet Take 40 mg by mouth Daily.  0   • STEGLATRO 15 MG tablet Take 15 mg by mouth Daily.     • [DISCONTINUED] busPIRone (BUSPAR) 30 MG tablet Take 30 mg by mouth 2 (Two) Times a Day.  5   • [DISCONTINUED] loratadine (CLARITIN) 10 MG tablet Take 10 mg by mouth Daily.  0   • [DISCONTINUED] Semaglutide,0.25 or 0.5MG/DOS, (OZEMPIC) 2 MG/1.5ML solution pen-injector Inject  under  "the skin into the appropriate area as directed 1 (One) Time Per Week.       No current facility-administered medications on file prior to visit.       Brilinta [ticagrelor] and Erythromycin    Past Medical History:   Diagnosis Date   • Arthritis    • Coronary artery disease    • Diabetes mellitus (HCC)    • Hyperlipidemia    • Hypertension    • Psoriasis    • Psoriatic arthritis (HCC)        Social History     Socioeconomic History   • Marital status:    Tobacco Use   • Smoking status: Never Smoker   • Smokeless tobacco: Never Used   Substance and Sexual Activity   • Alcohol use: No   • Drug use: No   • Sexual activity: Defer       Family History   Problem Relation Age of Onset   • Heart disease Mother    • Heart attack Mother    • Diabetes Mother    • Heart disease Father    • Heart disease Sister    • Diabetes Sister    • Heart disease Brother        Review of Systems   Constitutional: Positive for fatigue. Negative for chills and fever.   HENT: Negative for congestion, rhinorrhea and sore throat.    Eyes: Positive for visual disturbance (glasses).   Respiratory: Positive for shortness of breath. Negative for chest tightness and wheezing.    Cardiovascular: Positive for chest pain. Negative for palpitations and leg swelling.   Gastrointestinal: Negative.    Endocrine: Negative.    Genitourinary: Negative.    Musculoskeletal: Positive for arthralgias, back pain and neck pain.   Skin: Negative.  Negative for rash and wound.   Allergic/Immunologic: Positive for environmental allergies.   Neurological: Positive for dizziness, numbness (hands ) and headaches. Negative for weakness.   Hematological: Bruises/bleeds easily (bruises / bleeds).   Psychiatric/Behavioral: Negative.  Negative for sleep disturbance.       Objective   Vitals:    05/24/22 1116   BP: (!) 89/61   BP Location: Left arm   Patient Position: Sitting   Pulse: 72   SpO2: 97%   Weight: 84.9 kg (187 lb 3.2 oz)   Height: 165.1 cm (65\")      BP (!) " "89/61 (BP Location: Left arm, Patient Position: Sitting)   Pulse 72   Ht 165.1 cm (65\")   Wt 84.9 kg (187 lb 3.2 oz)   SpO2 97%   BMI 31.15 kg/m²    Lab Results (most recent)     None        Physical Exam  Vitals and nursing note reviewed.   Constitutional:       General: She is not in acute distress.     Appearance: She is well-developed.   HENT:      Head: Normocephalic and atraumatic.   Eyes:      Conjunctiva/sclera: Conjunctivae normal.      Pupils: Pupils are equal, round, and reactive to light.   Neck:      Vascular: No JVD.      Trachea: No tracheal deviation.   Cardiovascular:      Rate and Rhythm: Normal rate and regular rhythm.      Heart sounds: Normal heart sounds.   Pulmonary:      Effort: Pulmonary effort is normal.      Breath sounds: Normal breath sounds.   Abdominal:      General: Bowel sounds are normal. There is no distension.      Palpations: Abdomen is soft. There is no mass.      Tenderness: There is no abdominal tenderness. There is no guarding or rebound.   Musculoskeletal:         General: No tenderness or deformity. Normal range of motion.      Cervical back: Normal range of motion and neck supple.   Skin:     General: Skin is warm and dry.      Coloration: Skin is not pale.      Findings: No erythema or rash.   Neurological:      Mental Status: She is alert and oriented to person, place, and time.   Psychiatric:         Behavior: Behavior normal.         Thought Content: Thought content normal.         Judgment: Judgment normal.           Procedure     ECG 12 Lead    Date/Time: 5/24/2022 11:19 AM  Performed by: Skinny Giron PA  Authorized by: Skinny Giron PA   Comparison: compared with previous ECG from 8/12/2021  Comparison to previous ECG: Sinus rhythm, rate 70, probable normal axis, low voltage, no acute changes noted.                 Assessment & Plan      Diagnosis Plan   1. Atherosclerosis of native coronary artery of native heart with angina pectoris (HCC)  Stress " Test With Myocardial Perfusion One Day   2. Dyspnea on exertion  Stress Test With Myocardial Perfusion One Day   3. Essential hypertension  Stress Test With Myocardial Perfusion One Day     1.  I would like to schedule the patient for nuclear stress test for ischemia assessment.  Previous stress test suggested basically inferior and inferolateral wall ischemia.  Has no high risk markers were noted and she had no disease noted in the previous territory correlating to the same ischemic defect, we did not pursue catheterization.  I want to repeat a stress test however to ensure no increasing ischemic burden or risk otherwise.  We can compare to prior stress test to ensure that nothing further is needed and that medical management is still appropriate.    2.  Blood pressures appear to be well controlled.  She will continue to monitor that at home and call for any complications.  She is hypotensive today but typically normotensive.  We have discussed how to titrate antihypertensive therapy at home if needed for ongoing hypotensive issues.    3.  We will see the patient on routine 6-month intervals.  She will call for complications.  If stress is abnormal we will get her back in immediately.                      Electronically signed by:

## 2022-06-13 RX ORDER — ATORVASTATIN CALCIUM 40 MG/1
40 TABLET, FILM COATED ORAL DAILY
Qty: 90 TABLET | Refills: 3 | Status: SHIPPED | OUTPATIENT
Start: 2022-06-13

## 2022-06-22 ENCOUNTER — HOSPITAL ENCOUNTER (OUTPATIENT)
Dept: CARDIOLOGY | Facility: HOSPITAL | Age: 62
Discharge: HOME | End: 2022-06-22

## 2022-06-22 DIAGNOSIS — R06.09 DYSPNEA ON EXERTION: ICD-10-CM

## 2022-06-22 DIAGNOSIS — I25.119 ATHEROSCLEROSIS OF NATIVE CORONARY ARTERY OF NATIVE HEART WITH ANGINA PECTORIS: ICD-10-CM

## 2022-06-22 DIAGNOSIS — I10 ESSENTIAL HYPERTENSION: ICD-10-CM

## 2022-06-22 PROCEDURE — 93017 CV STRESS TEST TRACING ONLY: CPT

## 2022-06-22 PROCEDURE — 0 TECHNETIUM SESTAMIBI: Performed by: INTERNAL MEDICINE

## 2022-06-22 PROCEDURE — 78452 HT MUSCLE IMAGE SPECT MULT: CPT

## 2022-06-22 PROCEDURE — A9500 TC99M SESTAMIBI: HCPCS | Performed by: INTERNAL MEDICINE

## 2022-06-22 PROCEDURE — 25010000002 REGADENOSON 0.4 MG/5ML SOLUTION: Performed by: INTERNAL MEDICINE

## 2022-06-22 PROCEDURE — 78452 HT MUSCLE IMAGE SPECT MULT: CPT | Performed by: INTERNAL MEDICINE

## 2022-06-22 PROCEDURE — 93018 CV STRESS TEST I&R ONLY: CPT | Performed by: INTERNAL MEDICINE

## 2022-06-22 RX ADMIN — REGADENOSON 0.4 MG: 0.08 INJECTION, SOLUTION INTRAVENOUS at 10:00

## 2022-06-22 RX ADMIN — TECHNETIUM TC 99M SESTAMIBI 1 DOSE: 1 INJECTION INTRAVENOUS at 10:00

## 2022-06-22 RX ADMIN — TECHNETIUM TC 99M SESTAMIBI 1 DOSE: 1 INJECTION INTRAVENOUS at 08:35

## 2022-06-23 LAB
BH CV REST NUCLEAR ISOTOPE DOSE: 10 MCI
BH CV STRESS COMMENTS STAGE 1: NORMAL
BH CV STRESS DOSE REGADENOSON STAGE 1: 0.4
BH CV STRESS DURATION MIN STAGE 1: 0
BH CV STRESS DURATION SEC STAGE 1: 10
BH CV STRESS NUCLEAR ISOTOPE DOSE: 30 MCI
BH CV STRESS PROTOCOL 1: NORMAL
BH CV STRESS RECOVERY BP: NORMAL MMHG
BH CV STRESS RECOVERY HR: 70 BPM
BH CV STRESS STAGE 1: 1
MAXIMAL PREDICTED HEART RATE: 160 BPM
PERCENT MAX PREDICTED HR: 47.5 %
STRESS BASELINE BP: NORMAL MMHG
STRESS BASELINE HR: 69 BPM
STRESS PERCENT HR: 56 %
STRESS POST PEAK BP: NORMAL MMHG
STRESS POST PEAK HR: 76 BPM
STRESS TARGET HR: 136 BPM

## 2022-06-27 ENCOUNTER — TELEPHONE (OUTPATIENT)
Dept: CARDIOLOGY | Facility: CLINIC | Age: 62
End: 2022-06-27

## 2022-06-27 NOTE — TELEPHONE ENCOUNTER
Patient to be seen in office to review ABN stress on 6/28/2022 @ 11 am     AT [facility]         ----- Message from DAYNE Lamb sent at 6/24/2022  1:13 PM EDT -----  Follow-up 2 to 4 weeks.

## 2022-06-28 ENCOUNTER — OFFICE VISIT (OUTPATIENT)
Dept: CARDIOLOGY | Facility: CLINIC | Age: 62
End: 2022-06-28

## 2022-06-28 VITALS
BODY MASS INDEX: 30.75 KG/M2 | HEIGHT: 65 IN | OXYGEN SATURATION: 97 % | SYSTOLIC BLOOD PRESSURE: 109 MMHG | WEIGHT: 184.6 LBS | HEART RATE: 76 BPM | DIASTOLIC BLOOD PRESSURE: 68 MMHG

## 2022-06-28 DIAGNOSIS — R06.09 DYSPNEA ON EXERTION: ICD-10-CM

## 2022-06-28 DIAGNOSIS — I25.10 CORONARY ARTERY DISEASE INVOLVING NATIVE CORONARY ARTERY OF NATIVE HEART WITHOUT ANGINA PECTORIS: Primary | ICD-10-CM

## 2022-06-28 DIAGNOSIS — I10 ESSENTIAL HYPERTENSION: ICD-10-CM

## 2022-06-28 PROCEDURE — 99213 OFFICE O/P EST LOW 20 MIN: CPT | Performed by: PHYSICIAN ASSISTANT

## 2022-06-28 NOTE — PROGRESS NOTES
Problem list     Subjective   Bonnie Mohr is a 61 y.o. female     Chief Complaint   Patient presents with   • Follow-up     ABN testing    • Coronary Artery Disease   PROBLEM LIST:         1.  Coronary artery disease  1.1 Cardiac catheterization February 2018 because of posterolateral ischemia and elevated TID with high-grade stenosis of the RCA status post stenting.  70-80% ostial stenosis of the LAD with medical management recommended and percutaneous intervention for refractory symptoms  1.2 [facility] 07/13/18 resulted in stenting X2 to LAD and stenting X1 to RCA  1.3 Repeat [facility], 6/21, in the setting of abnormal noninvasive studies. The patient had stenting of the mid LAD for 80% heavily calcified stenosis. She had residual 50% IntraStent stenosis of previously stented segment which was advised to manage medically. She had patent stents to the RCA. The circumflex had minimal irregularities.  2.  Preserved systolic function  3.  Psoriatic arthritis  4.  Diabetes mellitus  5.  Hypertension  6.  Dyslipidemia     HPI  The patient presents back to review stress test findings.  She was scheduled for a stress test because of recurrent symptoms in August, 2021.  This supported inferoapical, inferior, and inferolateral wall ischemia.  As there was no significant coronary disease in that distribution by her at that time recent cath, we did not pursue catheterization.  She was scheduled for repeat stress test in June, 2022, and presents today to discuss results of the same.  Stress test in follow-up supported again inferoapical ischemia.  Post stress EF was at 76%.  The patient feels fine.  She reports no significant chest pain.  Her dyspnea has persisted but began at baseline.  She denies failure nor dysrhythmic symptoms.  She has no further complaints otherwise at this time.    Current Outpatient Medications on File Prior to Visit   Medication Sig Dispense Refill   • aspirin 81 MG tablet Take 81 mg by mouth Daily.     •  atorvastatin (LIPITOR) 40 MG tablet TAKE 1 TABLET BY MOUTH DAILY 90 tablet 3   • benazepril (LOTENSIN) 5 MG tablet TAKE 1 TABLET BY MOUTH DAILY 90 tablet 3   • carvedilol (COREG) 12.5 MG tablet TAKE 1 TABLET BY MOUTH TWICE DAILY 180 tablet 3   • clopidogrel (PLAVIX) 75 MG tablet Take 1 tablet by mouth Daily. 90 tablet 3   • cyclobenzaprine (FLEXERIL) 10 MG tablet Take 10 mg by mouth 2 (Two) Times a Day.  0   • Dulaglutide (TRULICITY SC) Inject  under the skin into the appropriate area as directed.     • gabapentin (NEURONTIN) 300 MG capsule Take 300 mg by mouth every night at bedtime.  0   • HUMIRA PEN 40 MG/0.8ML Pen-injector Kit Inject 40 mg under the skin into the appropriate area as directed Every 14 (Fourteen) Days.     • hydroCHLOROthiazide (HYDRODIURIL) 12.5 MG tablet TAKE 1 TABLET BY MOUTH DAILY 90 tablet 3   • HYDROcodone-acetaminophen (NORCO) 5-325 MG per tablet Take 1 tablet by mouth 2 (Two) Times a Day.  0   • hydrOXYzine (ATARAX) 25 MG tablet Take 25 mg by mouth As Needed.  2   • Insulin Glargine (BASAGLAR KWIKPEN) 100 UNIT/ML injection pen Inject 50 Units under the skin into the appropriate area as directed Daily.  1   • Krill Oil 350 MG capsule Take  by mouth Daily.     • montelukast (SINGULAIR) 10 MG tablet Take 10 mg by mouth Daily.  0   • Multiple Vitamin (MULTI VITAMIN PO) Take  by mouth Daily.     • nitroglycerin (NITROSTAT) 0.4 MG SL tablet 1 under the tongue as needed for angina, may repeat q5mins for up three doses 25 tablet 11   • Omeprazole 20 MG tablet delayed-release Take 40 mg by mouth Daily.  0   • pantoprazole (PROTONIX) 40 MG EC tablet Take 40 mg by mouth Daily.     • PARoxetine (PAXIL) 40 MG tablet Take 40 mg by mouth Daily.  0   • STEGLATRO 15 MG tablet Take 15 mg by mouth Daily.       No current facility-administered medications on file prior to visit.       Brilinta [ticagrelor] and Erythromycin    Past Medical History:   Diagnosis Date   • Arthritis    • Coronary artery disease   "  • Diabetes mellitus (HCC)    • Hyperlipidemia    • Hypertension    • Psoriasis    • Psoriatic arthritis (HCC)        Social History     Socioeconomic History   • Marital status:    Tobacco Use   • Smoking status: Never Smoker   • Smokeless tobacco: Never Used   Substance and Sexual Activity   • Alcohol use: No   • Drug use: No   • Sexual activity: Defer       Family History   Problem Relation Age of Onset   • Heart disease Mother    • Heart attack Mother    • Diabetes Mother    • Heart disease Father    • Heart disease Sister    • Diabetes Sister    • Heart disease Brother        Review of Systems   Constitutional: Positive for fatigue. Negative for chills and fever.   HENT: Positive for congestion and rhinorrhea. Negative for sore throat.    Eyes: Positive for visual disturbance (glasses).   Respiratory: Positive for chest tightness. Negative for shortness of breath and wheezing.    Cardiovascular: Positive for chest pain. Negative for palpitations and leg swelling.   Gastrointestinal: Negative.    Endocrine: Negative.    Genitourinary: Negative.    Musculoskeletal: Positive for arthralgias, back pain and neck pain.   Skin: Negative.  Negative for rash and wound.   Allergic/Immunologic: Positive for environmental allergies.   Neurological: Negative.  Negative for dizziness, weakness, numbness and headaches.   Hematological: Bruises/bleeds easily.   Psychiatric/Behavioral: Negative.  Negative for sleep disturbance.       Objective   Vitals:    06/28/22 1116   BP: 109/68   BP Location: Left arm   Patient Position: Sitting   Pulse: 76   SpO2: 97%   Weight: 83.7 kg (184 lb 9.6 oz)   Height: 165.1 cm (65\")      /68 (BP Location: Left arm, Patient Position: Sitting)   Pulse 76   Ht 165.1 cm (65\")   Wt 83.7 kg (184 lb 9.6 oz)   SpO2 97%   BMI 30.72 kg/m²    Lab Results (most recent)     None        Physical Exam  Vitals and nursing note reviewed.   Constitutional:       General: She is not in acute " distress.     Appearance: She is well-developed.   HENT:      Head: Normocephalic and atraumatic.   Eyes:      Conjunctiva/sclera: Conjunctivae normal.      Pupils: Pupils are equal, round, and reactive to light.   Neck:      Vascular: No JVD.      Trachea: No tracheal deviation.   Cardiovascular:      Rate and Rhythm: Normal rate and regular rhythm.      Heart sounds: Normal heart sounds.   Pulmonary:      Effort: Pulmonary effort is normal.      Breath sounds: Normal breath sounds.   Abdominal:      General: Bowel sounds are normal. There is no distension.      Palpations: Abdomen is soft. There is no mass.      Tenderness: There is no abdominal tenderness. There is no guarding or rebound.   Musculoskeletal:         General: No tenderness or deformity. Normal range of motion.      Cervical back: Normal range of motion and neck supple.   Skin:     General: Skin is warm and dry.      Coloration: Skin is not pale.      Findings: No erythema or rash.   Neurological:      Mental Status: She is alert and oriented to person, place, and time.   Psychiatric:         Behavior: Behavior normal.         Thought Content: Thought content normal.         Judgment: Judgment normal.           Procedure   Procedures       Assessment & Plan      Diagnosis Plan   1. Coronary artery disease involving native coronary artery of native heart without angina pectoris     2. Dyspnea on exertion     3. Essential hypertension     1.  The patient presents in for follow-up and to review stress test findings.  The patient had a stress test all as above.  This again supported inferoapical ischemia.  This is a very small segment.  No high risk markers are noted.  The patient feels fine clinically and from cardiovascular standpoint otherwise.  I still feel that we can manage this medically and she had no hemodynamically significant disease by recent catheterization to correspond to her ischemic defect.  She agrees with this plan as she is doing  well.    2.  In that setting, I will continue medications without change.    3.  The patient will call to us immediately for any complications.  I would continue to see her on 3-month intervals for now, seeing her back in 3 months for a symptom check in 6 months formally through the clinic.  She is well aware that for unstable symptoms prior to that, she should contact us immediately, and if severe present on to the emergency room.                          Electronically signed by:

## 2022-07-29 DIAGNOSIS — R06.09 DYSPNEA ON EXERTION: ICD-10-CM

## 2022-07-29 RX ORDER — CLOPIDOGREL BISULFATE 75 MG/1
75 TABLET ORAL DAILY
Qty: 90 TABLET | Refills: 3 | Status: SHIPPED | OUTPATIENT
Start: 2022-07-29

## 2022-11-08 DIAGNOSIS — I10 ESSENTIAL HYPERTENSION: Primary | ICD-10-CM

## 2022-11-08 NOTE — TELEPHONE ENCOUNTER
Name from pharmacy: BENAZEPRIL 5MG TABLETS          Will file in chart as: benazepril (LOTENSIN) 5 MG tablet    Sig: TAKE 1 TABLET BY MOUTH DAILY    Disp:  90 tablet    Refills:  3 (Pharmacy requested: Not specified)    Start: 11/8/2022    Class: Normal    Non-formulary    Last ordered: 1 year ago by DAYNE Lamb Last refill: 8/3/2022    Rx #: [id number]    ACE Inhibitors Protocol Failed 11/08/2022 09:50 AM   Protocol Details  Normal serum potassium in past 12 months    GFR greater than 30 mL/min in past 12 months    No active pregnancy on record    Blood pressure on record    No positive pregnancy test in past 12 months    Patient is not on Entresto    Patient is not on an ARB    Recent or future visit with authorizing provider

## 2022-11-09 RX ORDER — BENAZEPRIL HYDROCHLORIDE 5 MG/1
5 TABLET, FILM COATED ORAL DAILY
Qty: 90 TABLET | Refills: 3 | Status: SHIPPED | OUTPATIENT
Start: 2022-11-09

## 2022-11-22 ENCOUNTER — APPOINTMENT (OUTPATIENT)
Dept: WOMENS IMAGING | Facility: HOSPITAL | Age: 62
End: 2022-11-22

## 2022-11-22 PROCEDURE — 77067 SCR MAMMO BI INCL CAD: CPT | Performed by: RADIOLOGY

## 2022-11-22 PROCEDURE — 77063 BREAST TOMOSYNTHESIS BI: CPT | Performed by: RADIOLOGY

## 2023-01-03 ENCOUNTER — OFFICE VISIT (OUTPATIENT)
Dept: CARDIOLOGY | Facility: CLINIC | Age: 63
End: 2023-01-03
Payer: MEDICARE

## 2023-01-03 VITALS
OXYGEN SATURATION: 98 % | HEART RATE: 80 BPM | DIASTOLIC BLOOD PRESSURE: 65 MMHG | SYSTOLIC BLOOD PRESSURE: 106 MMHG | BODY MASS INDEX: 30.16 KG/M2 | HEIGHT: 65 IN | WEIGHT: 181 LBS

## 2023-01-03 DIAGNOSIS — R06.09 DYSPNEA ON EXERTION: ICD-10-CM

## 2023-01-03 DIAGNOSIS — I25.118 CORONARY ARTERY DISEASE OF NATIVE ARTERY OF NATIVE HEART WITH STABLE ANGINA PECTORIS: Primary | ICD-10-CM

## 2023-01-03 DIAGNOSIS — I10 ESSENTIAL HYPERTENSION: ICD-10-CM

## 2023-01-03 PROCEDURE — 1160F RVW MEDS BY RX/DR IN RCRD: CPT | Performed by: PHYSICIAN ASSISTANT

## 2023-01-03 PROCEDURE — 99213 OFFICE O/P EST LOW 20 MIN: CPT | Performed by: PHYSICIAN ASSISTANT

## 2023-01-03 PROCEDURE — 1159F MED LIST DOCD IN RCRD: CPT | Performed by: PHYSICIAN ASSISTANT

## 2023-01-03 NOTE — PROGRESS NOTES
Subjective   Bonnie Mohr is a 62 y.o. female     Chief Complaint   Patient presents with   • Follow-up     7 months   PROBLEM LIST:         1.  Coronary artery disease  1.1 Cardiac catheterization February 2018 because of posterolateral ischemia and elevated TID with high-grade stenosis of the RCA status post stenting.  70-80% ostial stenosis of the LAD with medical management recommended and percutaneous intervention for refractory symptoms  1.2 [facility] 07/13/18 resulted in stenting X2 to LAD and stenting X1 to RCA  1.3 Repeat [facility], 6/21, in the setting of abnormal noninvasive studies. The patient had stenting of the mid LAD for 80% heavily calcified stenosis. She had residual 50% IntraStent stenosis of previously stented segment which was advised to manage medically. She had patent stents to the RCA. The circumflex had minimal irregularities.  2.  Preserved systolic function  3.  Psoriatic arthritis  4.  Diabetes mellitus  5.  Hypertension  6.  Dyslipidemia     HPI  The patient presents in the clinic today for follow-up.  She was scheduled for stress test evaluation previously.  Her initial stress test would have been in 2021 and supported inferoseptal, diaphragmatic, and possible lateral wall ischemia.  As there is no disease by previous catheterization to support abnormalities in that area, we follow the patient clinically.  Repeat stress test eventually was performed in June of this year, again supporting inferoapical ischemia.  Post stress EF was at 76%.  We discussed consideration for catheterization.  As the patient was doing well at that time, she preferred continued monitoring.  We wanted to see her back today to evaluate clinical scenario.  At this time, the patient feels that she is doing fairly well.  Only rarely will she experience chest pain.  She has completely stable dyspnea.  She denies failure nor dysrhythmic symptoms.  To her knowledge she typically is normotensive.  She has no further complaints  otherwise and feels that she is doing well.      Current Outpatient Medications   Medication Sig Dispense Refill   • aspirin 81 MG tablet Take 81 mg by mouth Daily.     • atorvastatin (LIPITOR) 40 MG tablet TAKE 1 TABLET BY MOUTH DAILY 90 tablet 3   • benazepril (LOTENSIN) 5 MG tablet TAKE 1 TABLET BY MOUTH DAILY 90 tablet 3   • carvedilol (COREG) 12.5 MG tablet TAKE 1 TABLET BY MOUTH TWICE DAILY 180 tablet 3   • clopidogrel (PLAVIX) 75 MG tablet TAKE 1 TABLET BY MOUTH DAILY 90 tablet 3   • cyclobenzaprine (FLEXERIL) 10 MG tablet Take 10 mg by mouth 2 (Two) Times a Day.  0   • Dulaglutide (TRULICITY SC) Inject  under the skin into the appropriate area as directed.     • gabapentin (NEURONTIN) 300 MG capsule Take 300 mg by mouth every night at bedtime.  0   • HUMIRA PEN 40 MG/0.8ML Pen-injector Kit Inject 40 mg under the skin into the appropriate area as directed Every 14 (Fourteen) Days.     • hydroCHLOROthiazide (HYDRODIURIL) 12.5 MG tablet TAKE 1 TABLET BY MOUTH DAILY 90 tablet 3   • HYDROcodone-acetaminophen (NORCO) 5-325 MG per tablet Take 1 tablet by mouth 2 (Two) Times a Day.  0   • hydrOXYzine (ATARAX) 25 MG tablet Take 25 mg by mouth As Needed.  2   • Insulin Glargine (BASAGLAR KWIKPEN) 100 UNIT/ML injection pen Inject 50 Units under the skin into the appropriate area as directed Daily.  1   • Krill Oil 350 MG capsule Take  by mouth Daily.     • montelukast (SINGULAIR) 10 MG tablet Take 10 mg by mouth Daily.  0   • Multiple Vitamin (MULTI VITAMIN PO) Take  by mouth Daily.     • nitroglycerin (NITROSTAT) 0.4 MG SL tablet 1 under the tongue as needed for angina, may repeat q5mins for up three doses 25 tablet 11   • Omeprazole 20 MG tablet delayed-release Take 40 mg by mouth Daily.  0   • pantoprazole (PROTONIX) 40 MG EC tablet Take 40 mg by mouth Daily.     • PARoxetine (PAXIL) 40 MG tablet Take 40 mg by mouth Daily.  0   • STEGLATRO 15 MG tablet Take 15 mg by mouth Daily.       No current  facility-administered medications for this visit.       Brilinta [ticagrelor] and Erythromycin    Past Medical History:   Diagnosis Date   • Arthritis    • Coronary artery disease    • Diabetes mellitus (HCC)    • Hyperlipidemia    • Hypertension    • Psoriasis    • Psoriatic arthritis (HCC)        Social History     Socioeconomic History   • Marital status:    Tobacco Use   • Smoking status: Never   • Smokeless tobacco: Never   Substance and Sexual Activity   • Alcohol use: No   • Drug use: No   • Sexual activity: Defer       Family History   Problem Relation Age of Onset   • Heart disease Mother    • Heart attack Mother    • Diabetes Mother    • Heart disease Father    • Heart disease Sister    • Diabetes Sister    • Heart disease Brother        Review of Systems   Constitutional: Negative.  Negative for activity change, appetite change, chills, fatigue and fever.   HENT: Positive for ear pain.         Fluid behind ears   Eyes: Negative.  Negative for visual disturbance.   Respiratory: Positive for wheezing. Negative for apnea, cough, chest tightness and shortness of breath.    Cardiovascular: Negative.  Negative for chest pain, palpitations and leg swelling.   Gastrointestinal: Negative.  Negative for blood in stool.   Endocrine: Negative.  Negative for cold intolerance and heat intolerance.   Genitourinary: Negative.  Negative for hematuria.   Musculoskeletal: Positive for arthralgias, back pain, gait problem, joint swelling, neck pain and neck stiffness.   Skin: Negative.  Negative for color change, rash and wound.   Allergic/Immunologic: Negative.  Negative for environmental allergies and food allergies.   Neurological: Negative for dizziness, syncope, weakness, light-headedness, numbness and headaches.   Hematological: Bruises/bleeds easily.   Psychiatric/Behavioral: Positive for sleep disturbance.       Objective     Vitals:    01/03/23 1122   BP: 106/65   BP Location: Left arm   Patient Position:  Sitting   Cuff Size: Adult   Pulse: 80   SpO2: 98%   Weight: 82.1 kg (181 lb)   Height: 165.1 cm (65\")        /65 (BP Location: Left arm, Patient Position: Sitting, Cuff Size: Adult)   Pulse 80   Ht 165.1 cm (65\")   Wt 82.1 kg (181 lb)   SpO2 98%   BMI 30.12 kg/m²      Lab Results (most recent)     None          Physical Exam  Vitals and nursing note reviewed.   Constitutional:       General: She is not in acute distress.     Appearance: She is well-developed.   HENT:      Head: Normocephalic and atraumatic.   Eyes:      Conjunctiva/sclera: Conjunctivae normal.      Pupils: Pupils are equal, round, and reactive to light.   Neck:      Vascular: No JVD.      Trachea: No tracheal deviation.   Cardiovascular:      Rate and Rhythm: Normal rate and regular rhythm.      Heart sounds: Normal heart sounds.   Pulmonary:      Effort: Pulmonary effort is normal.      Breath sounds: Normal breath sounds.   Abdominal:      General: Bowel sounds are normal. There is no distension.      Palpations: Abdomen is soft. There is no mass.      Tenderness: There is no abdominal tenderness. There is no guarding or rebound.   Musculoskeletal:         General: No tenderness or deformity. Normal range of motion.      Cervical back: Normal range of motion and neck supple.   Skin:     General: Skin is warm and dry.      Coloration: Skin is not pale.      Findings: No erythema or rash.   Neurological:      Mental Status: She is alert and oriented to person, place, and time.   Psychiatric:         Behavior: Behavior normal.         Thought Content: Thought content normal.         Judgment: Judgment normal.         Procedure   Procedures         Assessment & Plan      Diagnosis Plan   1. Coronary artery disease of native artery of native heart with stable angina pectoris (HCC)        2. Dyspnea on exertion        3. Essential hypertension          1.  At this time, the patient reports stability in symptoms.  Chest pain remains at  baseline.  Dyspnea is nonproblematic.  She has no further cardiovascular symptoms.    2.  Prior stress test is abnormal as outlined above.  We discussed consideration for catheterization to further define coronary anatomy.  As the patient is clinically stable, she would prefer to avoid further evaluation.  For change in clinical course she will call immediately.    3.  In that setting, we will continue medications.  We will see the patient on routine 6-month intervals, sooner for complications.  I will make no adjustments in medications.           Patient did not bring med list or medicine bottles to appointment, med list has been reviewed and updated based on patient's knowledge of their meds.       Electronically signed by:

## 2023-05-24 RX ORDER — ATORVASTATIN CALCIUM 40 MG/1
40 TABLET, FILM COATED ORAL DAILY
Qty: 90 TABLET | Refills: 3 | Status: SHIPPED | OUTPATIENT
Start: 2023-05-24

## 2023-07-24 DIAGNOSIS — R06.09 DYSPNEA ON EXERTION: ICD-10-CM

## 2023-07-24 RX ORDER — CLOPIDOGREL BISULFATE 75 MG/1
75 TABLET ORAL DAILY
Qty: 90 TABLET | Refills: 3 | Status: SHIPPED | OUTPATIENT
Start: 2023-07-24

## 2023-07-25 DIAGNOSIS — I10 ESSENTIAL HYPERTENSION: ICD-10-CM

## 2023-07-25 RX ORDER — BENAZEPRIL HYDROCHLORIDE 5 MG/1
5 TABLET, FILM COATED ORAL DAILY
Qty: 90 TABLET | Refills: 3 | Status: SHIPPED | OUTPATIENT
Start: 2023-07-25

## 2023-08-01 ENCOUNTER — HOSPITAL ENCOUNTER (OUTPATIENT)
Dept: CARDIOLOGY | Facility: HOSPITAL | Age: 63
Discharge: HOME | End: 2023-08-01
Payer: MEDICARE

## 2023-08-01 DIAGNOSIS — R06.09 DYSPNEA ON EXERTION: ICD-10-CM

## 2023-08-01 DIAGNOSIS — I10 ESSENTIAL HYPERTENSION: ICD-10-CM

## 2023-08-01 DIAGNOSIS — I25.118 CORONARY ARTERY DISEASE OF NATIVE ARTERY OF NATIVE HEART WITH STABLE ANGINA PECTORIS: ICD-10-CM

## 2023-08-01 LAB
BH CV REST NUCLEAR ISOTOPE DOSE: 10 MCI
BH CV STRESS COMMENTS STAGE 1: NORMAL
BH CV STRESS DOSE REGADENOSON STAGE 1: 0.4
BH CV STRESS DURATION MIN STAGE 1: 0
BH CV STRESS DURATION SEC STAGE 1: 10
BH CV STRESS NUCLEAR ISOTOPE DOSE: 30 MCI
BH CV STRESS PROTOCOL 1: NORMAL
BH CV STRESS RECOVERY BP: NORMAL MMHG
BH CV STRESS RECOVERY HR: 69 BPM
BH CV STRESS STAGE 1: 1
MAXIMAL PREDICTED HEART RATE: 159 BPM
PERCENT MAX PREDICTED HR: 44.65 %
STRESS BASELINE BP: NORMAL MMHG
STRESS BASELINE HR: 67 BPM
STRESS PERCENT HR: 53 %
STRESS POST PEAK BP: NORMAL MMHG
STRESS POST PEAK HR: 71 BPM
STRESS TARGET HR: 135 BPM

## 2023-08-01 PROCEDURE — A9500 TC99M SESTAMIBI: HCPCS | Performed by: INTERNAL MEDICINE

## 2023-08-01 PROCEDURE — 93017 CV STRESS TEST TRACING ONLY: CPT

## 2023-08-01 PROCEDURE — 0 TECHNETIUM SESTAMIBI: Performed by: INTERNAL MEDICINE

## 2023-08-01 PROCEDURE — 78452 HT MUSCLE IMAGE SPECT MULT: CPT

## 2023-08-01 PROCEDURE — 25010000002 REGADENOSON 0.4 MG/5ML SOLUTION: Performed by: PHYSICIAN ASSISTANT

## 2023-08-01 RX ORDER — REGADENOSON 0.08 MG/ML
0.4 INJECTION, SOLUTION INTRAVENOUS
Status: COMPLETED | OUTPATIENT
Start: 2023-08-01 | End: 2023-08-01

## 2023-08-01 RX ADMIN — REGADENOSON 0.4 MG: 0.08 INJECTION, SOLUTION INTRAVENOUS at 09:43

## 2023-08-01 RX ADMIN — TECHNETIUM TC 99M SESTAMIBI 1 DOSE: 1 INJECTION INTRAVENOUS at 09:43

## 2023-08-01 RX ADMIN — TECHNETIUM TC 99M SESTAMIBI 1 DOSE: 1 INJECTION INTRAVENOUS at 08:18

## 2023-08-07 ENCOUNTER — TELEPHONE (OUTPATIENT)
Dept: CARDIOLOGY | Facility: CLINIC | Age: 63
End: 2023-08-07
Payer: MEDICARE

## 2023-08-07 NOTE — TELEPHONE ENCOUNTER
Caller: Bonnie Mohr    Relationship to patient: Self    Best call back number: [phone]    Patient is needing: TEST RESULTS FROM LAST WEEKS TESTING. PLEASE ADVISE. THANK YOU

## 2023-08-07 NOTE — TELEPHONE ENCOUNTER
Notified patient that results are not available at this time. She will receive a call with recommendations when available.

## 2023-08-15 ENCOUNTER — TELEPHONE (OUTPATIENT)
Dept: CARDIOLOGY | Facility: CLINIC | Age: 63
End: 2023-08-15
Payer: MEDICARE

## 2023-08-15 NOTE — TELEPHONE ENCOUNTER
Notified pts  Daniel of [employer]( on [employer]) and will notify patient.         Left VM to return call.    Routine follow-up, sooner for symptoms.   ----- Message -----   From: Mark Toribio MD   Sent: 8/14/2023  12:00 PM EDT   To: DAYNE Lamb     Stress test results    1.  No scintigraphic evidence of ischemia.     2.  Preserved post stress ejection fraction of 71% with no focal wall motion abnormalities.     3.  Elevated transient ischemic dilation ratio of 1.24 of unknown significance.  Although multivessel disease cannot be excluded it is felt unlikely given the findings of #1 and 2 above.  No evidence of increased lung uptake of radiopharmaceutical to suggest increased LV filling pressures.

## 2023-08-15 NOTE — TELEPHONE ENCOUNTER
Caller: Bonnie Mohr    Relationship to patient: Self    Best call back number: [phone]    Patient is needing: TO DISCUSS TEST RESULTS, THANK YOU

## 2024-04-20 DIAGNOSIS — I10 ESSENTIAL HYPERTENSION: ICD-10-CM

## 2024-04-25 RX ORDER — BENAZEPRIL HYDROCHLORIDE 5 MG/1
5 TABLET ORAL DAILY
Qty: 90 TABLET | Refills: 3 | Status: SHIPPED | OUTPATIENT
Start: 2024-04-25

## 2024-06-15 DIAGNOSIS — I10 ESSENTIAL HYPERTENSION: ICD-10-CM

## 2024-06-17 RX ORDER — BENAZEPRIL HYDROCHLORIDE 5 MG/1
5 TABLET ORAL DAILY
Qty: 90 TABLET | Refills: 3 | OUTPATIENT
Start: 2024-06-17

## 2024-06-19 DIAGNOSIS — R06.09 DYSPNEA ON EXERTION: ICD-10-CM

## 2024-06-19 RX ORDER — CLOPIDOGREL BISULFATE 75 MG/1
75 TABLET ORAL DAILY
Qty: 30 TABLET | Refills: 0 | Status: SHIPPED | OUTPATIENT
Start: 2024-06-19

## 2025-07-17 ENCOUNTER — TELEPHONE (OUTPATIENT)
Dept: CARDIOLOGY | Facility: CLINIC | Age: 65
End: 2025-07-17
Payer: MEDICARE

## 2025-07-17 ENCOUNTER — OFFICE VISIT (OUTPATIENT)
Dept: CARDIOLOGY | Facility: CLINIC | Age: 65
End: 2025-07-17
Payer: MEDICARE

## 2025-07-17 VITALS
BODY MASS INDEX: 26.82 KG/M2 | DIASTOLIC BLOOD PRESSURE: 62 MMHG | OXYGEN SATURATION: 96 % | HEIGHT: 65 IN | HEART RATE: 68 BPM | WEIGHT: 161 LBS | SYSTOLIC BLOOD PRESSURE: 113 MMHG

## 2025-07-17 DIAGNOSIS — I10 ESSENTIAL HYPERTENSION: ICD-10-CM

## 2025-07-17 DIAGNOSIS — R06.09 DYSPNEA ON EXERTION: Primary | ICD-10-CM

## 2025-07-17 DIAGNOSIS — I25.10 CORONARY ARTERY DISEASE INVOLVING NATIVE CORONARY ARTERY OF NATIVE HEART, UNSPECIFIED WHETHER ANGINA PRESENT: ICD-10-CM

## 2025-07-17 PROCEDURE — 1159F MED LIST DOCD IN RCRD: CPT | Performed by: PHYSICIAN ASSISTANT

## 2025-07-17 PROCEDURE — 1160F RVW MEDS BY RX/DR IN RCRD: CPT | Performed by: PHYSICIAN ASSISTANT

## 2025-07-17 PROCEDURE — 3078F DIAST BP <80 MM HG: CPT | Performed by: PHYSICIAN ASSISTANT

## 2025-07-17 PROCEDURE — 93000 ELECTROCARDIOGRAM COMPLETE: CPT | Performed by: PHYSICIAN ASSISTANT

## 2025-07-17 PROCEDURE — 3074F SYST BP LT 130 MM HG: CPT | Performed by: PHYSICIAN ASSISTANT

## 2025-07-17 PROCEDURE — 99213 OFFICE O/P EST LOW 20 MIN: CPT | Performed by: PHYSICIAN ASSISTANT

## 2025-07-17 RX ORDER — INSULIN GLARGINE 100 [IU]/ML
INJECTION, SOLUTION SUBCUTANEOUS
COMMUNITY
Start: 2025-05-09

## 2025-07-17 RX ORDER — PANTOPRAZOLE SODIUM 40 MG/1
40 TABLET, DELAYED RELEASE ORAL DAILY
COMMUNITY

## 2025-07-17 NOTE — TELEPHONE ENCOUNTER
Received cardiac clearance request from DR ERIK GARCIA stating pt has EGD AND COLONOSCOPY scheduled for 08/07/2025 and is requiring a cardiac clearance. Placed cardiac clearance request in WOOD' inbox to review and address with provider.

## 2025-07-17 NOTE — LETTER
July 17, 2025             To Whom It May Concern:    We build our practice on integrity and patient care. The highest compliment we can receive is the referral of friends, family and patients to our office. We want to take this time to thank you for considering our group as part of your care team.    The medical records for Bonnie Mohr 1960 were reviewed for pre-operative clearance on 07/17/25. It has been determined that this patient has:  ACCEPTABLE RISK    Bonnie Mohr is currently on the following medications that will need to be held prior to surgery: CAN HOLD PLAVIX 5-7 DAYS. CONTINUE ASA.    This pre-operative evaluation has been completed based on patient reported medical conditions at the date of this letter, this evaluation may have considered in part results of additional testing, completion of an EKG and patient reported symptoms at the time of their visit.    Bonnie Mohr's pre-operative clearance is good for thirty(30) days from the date of this letter with no reported changes in health, symptoms or diagnosis from the patient, their primary care provider or your office.    Your office has reported the patient will under go FOR EGD AND COLONOSCOPY on 08/07/25 with Dr. GARCIA. If this appointment is changed or moved outside of thirty(30) days from 07/17/25 this pre-operative clearance is void.    If you have any further questions please give our office a call.    Thank you for your trust,      DAYNE Ngo

## 2025-07-17 NOTE — PROGRESS NOTES
Problem list     Subjective   Bonnie Mohr is a 64 y.o. female     Chief Complaint   Patient presents with    Follow-up     Patient in need of cardiac clearance for colonoscopy per Dr. Brown     Patient c/o     Chest Pain    Shortness of Breath   PROBLEM LIST:         1.  Coronary artery disease  1.1 Cardiac catheterization February 2018 because of posterolateral ischemia and elevated TID with high-grade stenosis of the RCA status post stenting.  70-80% ostial stenosis of the LAD with medical management recommended and percutaneous intervention for refractory symptoms  1.2 LHC 07/13/18 resulted in stenting X2 to LAD and stenting X1 to RCA  1.3 Repeat LHC, 6/21, in the setting of abnormal noninvasive studies. The patient had stenting of the mid LAD for 80% heavily calcified stenosis. She had residual 50% IntraStent stenosis of previously stented segment which was advised to manage medically. She had patent stents to the RCA. The circumflex had minimal irregularities.  2.  Preserved systolic function  3.  Psoriatic arthritis  4.  Diabetes mellitus  5.  Hypertension  6.  Dyslipidemia     HPI    The patient presents in the clinic today for routine follow-up.  She is in basically for yearly evaluation.  Cardiac clearance has been requested prior to colonoscopy.  Clinically, the patient does fairly well from general cardiovascular standpoint.  Cardiac history is as outlined above.  She reports rare chest pain, mostly atypical by description.  Most of what she describes as GI with in-depth review.  Dyspnea is at baseline.  She has no failure nor dysrhythmic symptoms.  To her knowledge she is typically normotensive.  She has no further complaints and feels that she is doing well from general cardiac standpoint.  Current Outpatient Medications on File Prior to Visit   Medication Sig Dispense Refill    aspirin 81 MG tablet Take 1 tablet by mouth Daily.      atorvastatin (LIPITOR) 40 MG tablet TAKE 1 TABLET BY MOUTH DAILY 90  tablet 3    benazepril (LOTENSIN) 5 MG tablet TAKE 1 TABLET BY MOUTH DAILY 90 tablet 3    carvedilol (COREG) 12.5 MG tablet TAKE 1 TABLET BY MOUTH TWICE DAILY (Patient taking differently: Take 0.5 tablets by mouth 2 (Two) Times a Day With Meals.) 180 tablet 3    clopidogrel (PLAVIX) 75 MG tablet TAKE 1 TABLET BY MOUTH DAILY 30 tablet 0    cyclobenzaprine (FLEXERIL) 10 MG tablet Take 1 tablet by mouth 3 (Three) Times a Day As Needed.  0    gabapentin (NEURONTIN) 300 MG capsule Take 1 capsule by mouth every night at bedtime.  0    HUMIRA PEN 40 MG/0.8ML Pen-injector Kit Inject 40 mg under the skin into the appropriate area as directed Every 14 (Fourteen) Days.      hydroCHLOROthiazide (HYDRODIURIL) 12.5 MG tablet TAKE 1 TABLET BY MOUTH DAILY 90 tablet 3    hydrOXYzine (ATARAX) 25 MG tablet Take 2 tablets by mouth Every 6 (Six) Hours As Needed.  2    Krill Oil 350 MG capsule Take  by mouth Daily.      Lantus SoloStar 100 UNIT/ML injection pen INJECT UP TO 50 UNITS UNDER THE SKIN ONCE DAILY      montelukast (SINGULAIR) 10 MG tablet Take 1 tablet by mouth Daily.  0    Multiple Vitamin (MULTI VITAMIN PO) Take  by mouth Daily.      nitroglycerin (NITROSTAT) 0.4 MG SL tablet 1 under the tongue as needed for angina, may repeat q5mins for up three doses 25 tablet 11    Ozempic, 1 MG/DOSE, 4 MG/3ML solution pen-injector INJECT 1 MG SUBCUTANEOUS ONCE WEEKLY      pantoprazole (PROTONIX) 40 MG EC tablet Take 1 tablet by mouth Daily.      PARoxetine (PAXIL) 40 MG tablet Take 1 tablet by mouth Daily.  0    STEGLATRO 15 MG tablet Take 1 tablet by mouth Daily.       No current facility-administered medications on file prior to visit.       Brilinta [ticagrelor] and Erythromycin    Past Medical History:   Diagnosis Date    Arthritis     Coronary artery disease     Diabetes mellitus     Hyperlipidemia     Hypertension     Psoriasis     Psoriatic arthritis        Social History     Socioeconomic History    Marital status:   "  Tobacco Use    Smoking status: Never    Smokeless tobacco: Never   Vaping Use    Vaping status: Never Used   Substance and Sexual Activity    Alcohol use: No    Drug use: No    Sexual activity: Defer       Family History   Problem Relation Age of Onset    Heart disease Mother     Heart attack Mother     Diabetes Mother     Heart disease Father     Heart disease Sister     Diabetes Sister     Heart disease Brother        Review of Systems   Constitutional: Negative.  Negative for chills, diaphoresis, fatigue and fever.   HENT: Negative.  Negative for hearing loss.    Eyes: Negative.  Negative for visual disturbance.   Respiratory:  Positive for cough, chest tightness and shortness of breath. Negative for apnea and wheezing.    Cardiovascular:  Positive for chest pain and leg swelling. Negative for palpitations.   Gastrointestinal: Negative.  Negative for abdominal pain and blood in stool.   Endocrine: Negative.    Genitourinary: Negative.  Negative for hematuria.   Musculoskeletal:  Positive for arthralgias, back pain, myalgias, neck pain and neck stiffness.   Skin: Negative.  Negative for rash and wound.   Allergic/Immunologic: Negative.  Negative for environmental allergies and food allergies.   Neurological: Negative.  Negative for dizziness, syncope, weakness, light-headedness, numbness and headaches.   Hematological:  Bruises/bleeds easily.   Psychiatric/Behavioral:  Negative for sleep disturbance.        Objective   Vitals:    07/17/25 1504   BP: 113/62   Pulse: 68   SpO2: 96%   Weight: 73 kg (161 lb)   Height: 165.1 cm (65\")      /62   Pulse 68   Ht 165.1 cm (65\")   Wt 73 kg (161 lb)   SpO2 96%   BMI 26.79 kg/m²    Lab Results (most recent)       None          Physical Exam  Vitals and nursing note reviewed.   Constitutional:       General: She is not in acute distress.     Appearance: She is well-developed.   HENT:      Head: Normocephalic and atraumatic.   Eyes:      Conjunctiva/sclera: " Conjunctivae normal.      Pupils: Pupils are equal, round, and reactive to light.   Neck:      Vascular: No JVD.      Trachea: No tracheal deviation.   Cardiovascular:      Rate and Rhythm: Normal rate and regular rhythm.      Heart sounds: Normal heart sounds.   Pulmonary:      Effort: Pulmonary effort is normal.      Breath sounds: Normal breath sounds.   Abdominal:      General: Bowel sounds are normal. There is no distension.      Palpations: Abdomen is soft. There is no mass.      Tenderness: There is no abdominal tenderness. There is no guarding or rebound.   Musculoskeletal:         General: No tenderness or deformity. Normal range of motion.      Cervical back: Normal range of motion and neck supple.   Skin:     General: Skin is warm and dry.      Coloration: Skin is not pale.      Findings: No erythema or rash.   Neurological:      Mental Status: She is alert and oriented to person, place, and time.   Psychiatric:         Behavior: Behavior normal.         Thought Content: Thought content normal.         Judgment: Judgment normal.           Procedure     ECG 12 Lead    Date/Time: 7/17/2025 3:12 PM  Performed by: Skinny Giron PA    Authorized by: Skinny Giron PA  Comparison: compared with previous ECG from 7/18/2023             Assessment & Plan      Diagnosis Plan   1. Dyspnea on exertion        2. Essential hypertension        3. Coronary artery disease involving native coronary artery of native heart, unspecified whether angina present          1.  At this time, the patient appears to be doing well from general cardiovascular standpoint.  She has chest pain intermittently which is mostly atypical.  She is pending GI evaluation.  She requests cardiac clearance prior to GI evaluation.    2.  I feel the patient is at acceptable risk for evaluation from cardiac standpoint.    3.  She appears to be doing well on current medical regimen, no adjustments will be made.    4.  We will continue to see her  on routine 6-month intervals, sooner for complications.  I have requested all recent laboratories, etc.  Nothing further and we will see her routinely as above.           Bonnie Fani  reports that she has never smoked. She has never used smokeless tobacco.       Electronically signed by: